# Patient Record
Sex: FEMALE | Race: OTHER | HISPANIC OR LATINO
[De-identification: names, ages, dates, MRNs, and addresses within clinical notes are randomized per-mention and may not be internally consistent; named-entity substitution may affect disease eponyms.]

---

## 2020-08-19 ENCOUNTER — APPOINTMENT (OUTPATIENT)
Dept: CARDIOLOGY | Facility: CLINIC | Age: 25
End: 2020-08-19
Payer: COMMERCIAL

## 2020-08-19 ENCOUNTER — APPOINTMENT (OUTPATIENT)
Dept: CARDIOLOGY | Facility: CLINIC | Age: 25
End: 2020-08-19

## 2020-08-19 ENCOUNTER — NON-APPOINTMENT (OUTPATIENT)
Age: 25
End: 2020-08-19

## 2020-08-19 VITALS
HEART RATE: 65 BPM | DIASTOLIC BLOOD PRESSURE: 68 MMHG | WEIGHT: 132 LBS | OXYGEN SATURATION: 100 % | SYSTOLIC BLOOD PRESSURE: 101 MMHG

## 2020-08-19 DIAGNOSIS — Z83.3 FAMILY HISTORY OF DIABETES MELLITUS: ICD-10-CM

## 2020-08-19 DIAGNOSIS — Z82.49 FAMILY HISTORY OF ISCHEMIC HEART DISEASE AND OTHER DISEASES OF THE CIRCULATORY SYSTEM: ICD-10-CM

## 2020-08-19 PROCEDURE — 99204 OFFICE O/P NEW MOD 45 MIN: CPT

## 2020-08-19 PROCEDURE — 93000 ELECTROCARDIOGRAM COMPLETE: CPT

## 2020-08-19 NOTE — DISCUSSION/SUMMARY
[FreeTextEntry1] : Sasha is a 24-year-old female here for evaluation. She reports worsening palpitations, and caffeine sensitivity, since getting sick with COVID earlier this year.\par Given her overall symptoms, we will start with an echocardiogram, and a 24-hour Holter for further evaluation. She will also need routine blood work after this.\par I have stressed the importance of getting back to exercise, to see how she feels. He will speak after the above tests, and arrange followup.

## 2020-08-19 NOTE — PHYSICAL EXAM
[General Appearance - Well Developed] : well developed [Normal Appearance] : normal appearance [Well Groomed] : well groomed [General Appearance - Well Nourished] : well nourished [No Deformities] : no deformities [General Appearance - In No Acute Distress] : no acute distress [Normal Conjunctiva] : the conjunctiva exhibited no abnormalities [Eyelids - No Xanthelasma] : the eyelids demonstrated no xanthelasmas [Normal Oral Mucosa] : normal oral mucosa [No Oral Pallor] : no oral pallor [No Oral Cyanosis] : no oral cyanosis [Normal Jugular Venous A Waves Present] : normal jugular venous A waves present [Normal Jugular Venous V Waves Present] : normal jugular venous V waves present [No Jugular Venous Obss A Waves] : no jugular venous boss A waves [Normal Rate] : normal [Normal S1] : normal S1 [Normal S2] : normal S2 [S3] : no S3 [S4] : no S4 [No Murmur] : no murmurs heard [Right Carotid Bruit] : no bruit heard over the right carotid [Left Carotid Bruit] : no bruit heard over the left carotid [Right Femoral Bruit] : no bruit heard over the right femoral artery [Left Femoral Bruit] : no bruit heard over the left femoral artery [2+] : left 2+ [No Abnormalities] : the abdominal aorta was not enlarged and no bruit was heard [No Pitting Edema] : no pitting edema present [Respiration, Rhythm And Depth] : normal respiratory rhythm and effort [Exaggerated Use Of Accessory Muscles For Inspiration] : no accessory muscle use [Auscultation Breath Sounds / Voice Sounds] : lungs were clear to auscultation bilaterally [Abdomen Soft] : soft [Abdomen Tenderness] : non-tender [Abdomen Mass (___ Cm)] : no abdominal mass palpated [Abnormal Walk] : normal gait [Gait - Sufficient For Exercise Testing] : the gait was sufficient for exercise testing [Nail Clubbing] : no clubbing of the fingernails [Cyanosis, Localized] : no localized cyanosis [Petechial Hemorrhages (___cm)] : no petechial hemorrhages [Skin Color & Pigmentation] : normal skin color and pigmentation [] : no rash [No Venous Stasis] : no venous stasis [No Skin Ulcers] : no skin ulcer [Skin Lesions] : no skin lesions [No Xanthoma] : no  xanthoma was observed [Oriented To Time, Place, And Person] : oriented to person, place, and time [Affect] : the affect was normal [Mood] : the mood was normal [No Anxiety] : not feeling anxious

## 2020-08-19 NOTE — HISTORY OF PRESENT ILLNESS
[FreeTextEntry1] : Sasha is a 24-year-old female here for evaluation.\par She is generally healthy. She got COVID in 4/2020, and has since reported an occasional left-sided chest pain, fatigue on exertion, and intermittent palpitations.\par She feels that she is very sensitive to caffeine and soda, with racing heart rates after drinking either. Prior to the virus, she did report elevated heart rates, usually in the middle of the night.\par \par She has not been exercising since the pandemic, but walks around without significant issue. She does not take any medications. There is a history of hypertension, diabetes, and CAD in her family. She denies toxic habits.

## 2020-09-11 ENCOUNTER — APPOINTMENT (OUTPATIENT)
Dept: CARDIOLOGY | Facility: CLINIC | Age: 25
End: 2020-09-11

## 2020-09-15 ENCOUNTER — APPOINTMENT (OUTPATIENT)
Dept: CARDIOLOGY | Facility: CLINIC | Age: 25
End: 2020-09-15
Payer: COMMERCIAL

## 2020-09-23 ENCOUNTER — APPOINTMENT (OUTPATIENT)
Dept: CARDIOLOGY | Facility: CLINIC | Age: 25
End: 2020-09-23

## 2020-10-04 PROCEDURE — 93224 XTRNL ECG REC UP TO 48 HRS: CPT

## 2020-10-29 ENCOUNTER — APPOINTMENT (OUTPATIENT)
Dept: CARDIOLOGY | Facility: CLINIC | Age: 25
End: 2020-10-29

## 2021-01-20 ENCOUNTER — APPOINTMENT (OUTPATIENT)
Dept: CARDIOLOGY | Facility: CLINIC | Age: 26
End: 2021-01-20
Payer: COMMERCIAL

## 2021-01-20 PROCEDURE — 99072 ADDL SUPL MATRL&STAF TM PHE: CPT

## 2021-01-20 PROCEDURE — 93306 TTE W/DOPPLER COMPLETE: CPT

## 2021-07-26 ENCOUNTER — TRANSCRIPTION ENCOUNTER (OUTPATIENT)
Age: 26
End: 2021-07-26

## 2021-08-05 ENCOUNTER — NON-APPOINTMENT (OUTPATIENT)
Age: 26
End: 2021-08-05

## 2021-08-06 ENCOUNTER — APPOINTMENT (OUTPATIENT)
Dept: INTERNAL MEDICINE | Facility: CLINIC | Age: 26
End: 2021-08-06
Payer: COMMERCIAL

## 2021-08-06 ENCOUNTER — NON-APPOINTMENT (OUTPATIENT)
Age: 26
End: 2021-08-06

## 2021-08-06 VITALS
OXYGEN SATURATION: 97 % | WEIGHT: 130 LBS | SYSTOLIC BLOOD PRESSURE: 110 MMHG | TEMPERATURE: 97.1 F | BODY MASS INDEX: 23.92 KG/M2 | HEIGHT: 62 IN | DIASTOLIC BLOOD PRESSURE: 70 MMHG | RESPIRATION RATE: 14 BRPM | HEART RATE: 78 BPM

## 2021-08-06 PROCEDURE — 99385 PREV VISIT NEW AGE 18-39: CPT

## 2021-08-06 NOTE — PHYSICAL EXAM
[No Acute Distress] : no acute distress [Well Nourished] : well nourished [Well Developed] : well developed [Well-Appearing] : well-appearing [Normal Sclera/Conjunctiva] : normal sclera/conjunctiva [PERRL] : pupils equal round and reactive to light [EOMI] : extraocular movements intact [Normal Outer Ear/Nose] : the outer ears and nose were normal in appearance [Normal Oropharynx] : the oropharynx was normal [No JVD] : no jugular venous distention [No Lymphadenopathy] : no lymphadenopathy [Supple] : supple [Thyroid Normal, No Nodules] : the thyroid was normal and there were no nodules present [No Respiratory Distress] : no respiratory distress  [No Accessory Muscle Use] : no accessory muscle use [Clear to Auscultation] : lungs were clear to auscultation bilaterally [Normal Rate] : normal rate  [Regular Rhythm] : with a regular rhythm [Normal S1, S2] : normal S1 and S2 [No Murmur] : no murmur heard [No Carotid Bruits] : no carotid bruits [No Abdominal Bruit] : a ~M bruit was not heard ~T in the abdomen [No Varicosities] : no varicosities [Pedal Pulses Present] : the pedal pulses are present [No Edema] : there was no peripheral edema [No Palpable Aorta] : no palpable aorta [No Extremity Clubbing/Cyanosis] : no extremity clubbing/cyanosis [Soft] : abdomen soft [Non Tender] : non-tender [Non-distended] : non-distended [No Masses] : no abdominal mass palpated [No HSM] : no HSM [Normal Bowel Sounds] : normal bowel sounds [Normal Posterior Cervical Nodes] : no posterior cervical lymphadenopathy [Normal Anterior Cervical Nodes] : no anterior cervical lymphadenopathy [No CVA Tenderness] : no CVA  tenderness [No Spinal Tenderness] : no spinal tenderness [No Joint Swelling] : no joint swelling [Grossly Normal Strength/Tone] : grossly normal strength/tone [No Rash] : no rash [Coordination Grossly Intact] : coordination grossly intact [No Focal Deficits] : no focal deficits [Normal Gait] : normal gait [Deep Tendon Reflexes (DTR)] : deep tendon reflexes were 2+ and symmetric [Normal Affect] : the affect was normal [Normal Insight/Judgement] : insight and judgment were intact [de-identified] : (+) pain in lumbar region with sidebending and rotation to right

## 2021-08-06 NOTE — HISTORY OF PRESENT ILLNESS
[de-identified] : 25 y.o. F with no significant PMHx presents as new pt to establish care. Pt with complaint of right-sided flank pain and lower lumbar pain for the past 2 weeks, intermittent, mostly when she is standing for too long. The pain is described as an achy pain and radiates down into the leg. Has taken advil or tylenol here and there which does seem to help. Fell 2 weeks ago, slid down the stairs and started afterwards. Went to GYN and had pelvic US and ruled out ovarian cyst and fibroid .Had a colonoscopy 3 months ago for constipation which was reportedly normal. Had covid last year.

## 2021-08-06 NOTE — PLAN
[FreeTextEntry1] : HCM: \par -check labs \par -UTD with pap smear \par \par Back pain: \par -recommend OTC NSAID prn \par -apply heat \par -PT if needed

## 2021-08-12 LAB
25(OH)D3 SERPL-MCNC: 26.9 NG/ML
ALBUMIN SERPL ELPH-MCNC: 4.4 G/DL
ALP BLD-CCNC: 83 U/L
ALT SERPL-CCNC: 14 U/L
ANION GAP SERPL CALC-SCNC: 14 MMOL/L
AST SERPL-CCNC: 14 U/L
BASOPHILS # BLD AUTO: 0.07 K/UL
BASOPHILS NFR BLD AUTO: 1 %
BILIRUB SERPL-MCNC: 0.2 MG/DL
BUN SERPL-MCNC: 11 MG/DL
CALCIUM SERPL-MCNC: 9.1 MG/DL
CHLORIDE SERPL-SCNC: 105 MMOL/L
CHOLEST SERPL-MCNC: 157 MG/DL
CO2 SERPL-SCNC: 22 MMOL/L
CREAT SERPL-MCNC: 0.67 MG/DL
EOSINOPHIL # BLD AUTO: 0.17 K/UL
EOSINOPHIL NFR BLD AUTO: 2.4 %
ESTIMATED AVERAGE GLUCOSE: 103 MG/DL
FOLATE SERPL-MCNC: 9.9 NG/ML
GLUCOSE SERPL-MCNC: 83 MG/DL
HBA1C MFR BLD HPLC: 5.2 %
HCT VFR BLD CALC: 38.2 %
HDLC SERPL-MCNC: 55 MG/DL
HGB BLD-MCNC: 12.3 G/DL
IMM GRANULOCYTES NFR BLD AUTO: 0.3 %
LDLC SERPL CALC-MCNC: 94 MG/DL
LYMPHOCYTES # BLD AUTO: 2.18 K/UL
LYMPHOCYTES NFR BLD AUTO: 31.2 %
MAN DIFF?: NORMAL
MCHC RBC-ENTMCNC: 29.1 PG
MCHC RBC-ENTMCNC: 32.2 GM/DL
MCV RBC AUTO: 90.3 FL
MONOCYTES # BLD AUTO: 0.5 K/UL
MONOCYTES NFR BLD AUTO: 7.2 %
NEUTROPHILS # BLD AUTO: 4.05 K/UL
NEUTROPHILS NFR BLD AUTO: 57.9 %
NONHDLC SERPL-MCNC: 101 MG/DL
PLATELET # BLD AUTO: 287 K/UL
POTASSIUM SERPL-SCNC: 4.7 MMOL/L
PROT SERPL-MCNC: 7 G/DL
RBC # BLD: 4.23 M/UL
RBC # FLD: 13.2 %
SODIUM SERPL-SCNC: 141 MMOL/L
TRIGL SERPL-MCNC: 37 MG/DL
TSH SERPL-ACNC: 2.3 UIU/ML
VIT B12 SERPL-MCNC: 719 PG/ML
WBC # FLD AUTO: 6.99 K/UL

## 2021-09-11 ENCOUNTER — APPOINTMENT (OUTPATIENT)
Dept: DISASTER EMERGENCY | Facility: OTHER | Age: 26
End: 2021-09-11

## 2021-10-16 ENCOUNTER — APPOINTMENT (OUTPATIENT)
Dept: DISASTER EMERGENCY | Facility: OTHER | Age: 26
End: 2021-10-16

## 2021-11-01 ENCOUNTER — APPOINTMENT (OUTPATIENT)
Dept: INTERNAL MEDICINE | Facility: CLINIC | Age: 26
End: 2021-11-01
Payer: COMMERCIAL

## 2021-11-01 ENCOUNTER — APPOINTMENT (OUTPATIENT)
Dept: DERMATOLOGY | Facility: CLINIC | Age: 26
End: 2021-11-01

## 2021-11-01 VITALS
RESPIRATION RATE: 14 BRPM | OXYGEN SATURATION: 98 % | WEIGHT: 127 LBS | TEMPERATURE: 97.3 F | SYSTOLIC BLOOD PRESSURE: 110 MMHG | DIASTOLIC BLOOD PRESSURE: 68 MMHG | HEIGHT: 62 IN | HEART RATE: 66 BPM | BODY MASS INDEX: 23.37 KG/M2

## 2021-11-01 DIAGNOSIS — N64.4 MASTODYNIA: ICD-10-CM

## 2021-11-01 PROCEDURE — 99214 OFFICE O/P EST MOD 30 MIN: CPT

## 2021-11-01 NOTE — PHYSICAL EXAM
[No Acute Distress] : no acute distress [Well-Appearing] : well-appearing [Normal Voice/Communication] : normal voice/communication [Normal Sclera/Conjunctiva] : normal sclera/conjunctiva [PERRL] : pupils equal round and reactive to light [No Respiratory Distress] : no respiratory distress  [No Accessory Muscle Use] : no accessory muscle use [Clear to Auscultation] : lungs were clear to auscultation bilaterally [Normal Rate] : normal rate  [Regular Rhythm] : with a regular rhythm [No Murmur] : no murmur heard [Coordination Grossly Intact] : coordination grossly intact [No Focal Deficits] : no focal deficits [Normal Gait] : normal gait [Deep Tendon Reflexes (DTR)] : deep tendon reflexes were 2+ and symmetric [Alert and Oriented x3] : oriented to person, place, and time [de-identified] : TTP b/l paraspinal muscles and b/l occiput, L>R

## 2021-11-01 NOTE — HISTORY OF PRESENT ILLNESS
[FreeTextEntry8] : Pt with complaint of pressure in the back of the head, mostly on the left side. It comes and goes, has been going on for a couple of months. Gets headaches when she ties her hair which is new for her. Since covid, she has been hyperaware of things and always thinking the worst of her symptoms. She hasn't taken anything for her symptoms. No changes in vision. The pain lasts for 2 hours at a time and comes and goes, 7/10 at its worst. No recent head trauma or injury. She has also been having left breast pain, had a breast US which was normal. She is also having right sided milky nipple discharge. Had blood work including prolactin which was reportedly normal. She made an appt to see a breast surgeon.

## 2021-11-01 NOTE — PLAN
[FreeTextEntry1] : occipital headache: could be 2/2 paraspinal muscle strain, recommend applying heat to paraspinal muscles and trial of OTC NSAIDs for one week, if no improvement recommend f/u with neuro \par breast pain, nipple discharge: f/u with breast surgery

## 2021-11-03 ENCOUNTER — RESULT REVIEW (OUTPATIENT)
Age: 26
End: 2021-11-03

## 2021-11-03 ENCOUNTER — APPOINTMENT (OUTPATIENT)
Dept: OTOLARYNGOLOGY | Facility: CLINIC | Age: 26
End: 2021-11-03
Payer: COMMERCIAL

## 2021-11-03 ENCOUNTER — NON-APPOINTMENT (OUTPATIENT)
Age: 26
End: 2021-11-03

## 2021-11-03 DIAGNOSIS — G50.1 ATYPICAL FACIAL PAIN: ICD-10-CM

## 2021-11-03 PROCEDURE — 99203 OFFICE O/P NEW LOW 30 MIN: CPT

## 2021-11-03 PROCEDURE — 70486 CT MAXILLOFACIAL W/O DYE: CPT

## 2021-11-03 NOTE — PHYSICAL EXAM
[Normal] : mucosa is normal [Midline] : trachea located in midline position [FreeTextEntry1] : sinus tenderness on left frontal, left maxillary, and left ethmoid

## 2021-11-03 NOTE — HISTORY OF PRESENT ILLNESS
[de-identified] : 25 y.o female presents with 4 day history of headache. Headache is located left temporal,maxillary,frontal and ethmoidal areas. She is having some yellow gelatinous discharge from left nare.

## 2021-11-03 NOTE — ASSESSMENT
[FreeTextEntry1] : 25 y.o female presents with 4 day history of headache. Headache is located left temporal,maxillary,frontal and ethmoidal areas. She is having some yellow gelatinous discharge from left nare.\par \par Tenderness on all sinuses \par Yellow discharge\par \par CT sinus today\par Chronic B/L sinus disease. Polyp left maxillary sinus. Thickening in ethmoids. Obstruction of frontals.. Deviated septum. ? old septal fracture\par \par \par Plan\par CT sinus reviewed and interpreted\par Start Augmentin 500 mg 1 tab BID\par Flonase 2 sprays in each nostril QD(spray laterally)

## 2021-11-14 ENCOUNTER — NON-APPOINTMENT (OUTPATIENT)
Age: 26
End: 2021-11-14

## 2021-11-15 ENCOUNTER — APPOINTMENT (OUTPATIENT)
Dept: DERMATOLOGY | Facility: CLINIC | Age: 26
End: 2021-11-15

## 2021-11-17 ENCOUNTER — APPOINTMENT (OUTPATIENT)
Dept: SURGICAL ONCOLOGY | Facility: CLINIC | Age: 26
End: 2021-11-17
Payer: COMMERCIAL

## 2021-11-17 ENCOUNTER — NON-APPOINTMENT (OUTPATIENT)
Age: 26
End: 2021-11-17

## 2021-11-17 VITALS
HEART RATE: 84 BPM | OXYGEN SATURATION: 100 % | SYSTOLIC BLOOD PRESSURE: 113 MMHG | WEIGHT: 127 LBS | BODY MASS INDEX: 23.37 KG/M2 | RESPIRATION RATE: 15 BRPM | TEMPERATURE: 98.7 F | HEIGHT: 62 IN | DIASTOLIC BLOOD PRESSURE: 73 MMHG

## 2021-11-17 PROCEDURE — 99204 OFFICE O/P NEW MOD 45 MIN: CPT

## 2021-11-17 NOTE — OB HISTORY
[Menarche Age ____] : menarche age [unfilled] [Definite ___ (Date)] : the last menstrual period was [unfilled] [Total Preg ___] : G[unfilled]

## 2021-11-30 RX ORDER — FLUTICASONE PROPIONATE 50 UG/1
50 SPRAY, METERED NASAL
Qty: 3 | Refills: 3 | Status: ACTIVE | COMMUNITY
Start: 2021-11-03 | End: 1900-01-01

## 2021-11-30 NOTE — ASSESSMENT
[FreeTextEntry1] : Bilateral nipple discharge likely physiologic \par Reassured patient that index of suspicion for malignancy is low\par BI-RADS 1 ultrasound October 2021\par RTO 6 months \par

## 2021-11-30 NOTE — ADDENDUM
[FreeTextEntry1] : I, Tracey Frankel, acted solely as a scribe for Dr. Ash Morrow on this date 11/17/2021.\par

## 2021-11-30 NOTE — PHYSICAL EXAM
[Normal] : supple, no neck mass and thyroid not enlarged [Normal Neck Lymph Nodes] : normal neck lymph nodes  [Normal Supraclavicular Lymph Nodes] : normal supraclavicular lymph nodes [Normal Groin Lymph Nodes] : normal groin lymph nodes [Normal Axillary Lymph Nodes] : normal axillary lymph nodes [Normal] : oriented to person, place and time, with appropriate affect [de-identified] : cloudy discharge right 11:00 duct and left 8:00 duct.  GUAIC negative bilaterally.

## 2021-11-30 NOTE — HISTORY OF PRESENT ILLNESS
[de-identified] : Patient is a 26 y/o female who presents an initial consultation. She notes white/milky, non-spontaneous bilateral nipple discharge for the past year. Denies any bloody discharge. Reports regular menstrual cycles. Her recent Prolactin level was 11.4 \par \par Her recent bilateral ultrasound performed on 10/21/21 was unremarkable study. No cystic or solid lesions identified bilaterally. Clinical management of symptoms advised. (BI-RADS 1-negative)\par \par Patient reports no family history of breast cancer. \par Denies any prior breast biopsies.

## 2021-11-30 NOTE — CONSULT LETTER
[Dear  ___] : Dear  [unfilled], [Consult Letter:] : I had the pleasure of evaluating your patient, [unfilled]. [Please see my note below.] : Please see my note below. [Sincerely,] : Sincerely, [FreeTextEntry3] : Ash Morrow MD FACS\par

## 2021-12-08 ENCOUNTER — APPOINTMENT (OUTPATIENT)
Dept: NEUROLOGY | Facility: CLINIC | Age: 26
End: 2021-12-08

## 2022-01-04 DIAGNOSIS — Z20.822 CONTACT WITH AND (SUSPECTED) EXPOSURE TO COVID-19: ICD-10-CM

## 2022-01-08 ENCOUNTER — APPOINTMENT (OUTPATIENT)
Dept: DERMATOLOGY | Facility: CLINIC | Age: 27
End: 2022-01-08

## 2022-01-11 ENCOUNTER — LABORATORY RESULT (OUTPATIENT)
Age: 27
End: 2022-01-11

## 2022-01-11 ENCOUNTER — APPOINTMENT (OUTPATIENT)
Dept: DERMATOLOGY | Facility: CLINIC | Age: 27
End: 2022-01-11

## 2022-01-21 ENCOUNTER — APPOINTMENT (OUTPATIENT)
Dept: CARDIOLOGY | Facility: CLINIC | Age: 27
End: 2022-01-21

## 2022-01-25 ENCOUNTER — APPOINTMENT (OUTPATIENT)
Dept: NEUROLOGY | Facility: CLINIC | Age: 27
End: 2022-01-25

## 2022-01-26 ENCOUNTER — APPOINTMENT (OUTPATIENT)
Dept: INTERNAL MEDICINE | Facility: CLINIC | Age: 27
End: 2022-01-26
Payer: COMMERCIAL

## 2022-01-26 ENCOUNTER — NON-APPOINTMENT (OUTPATIENT)
Age: 27
End: 2022-01-26

## 2022-01-26 VITALS
WEIGHT: 130 LBS | TEMPERATURE: 97.5 F | DIASTOLIC BLOOD PRESSURE: 72 MMHG | OXYGEN SATURATION: 98 % | HEART RATE: 90 BPM | SYSTOLIC BLOOD PRESSURE: 114 MMHG | RESPIRATION RATE: 15 BRPM | BODY MASS INDEX: 23.92 KG/M2 | HEIGHT: 62 IN

## 2022-01-26 PROCEDURE — 93000 ELECTROCARDIOGRAM COMPLETE: CPT

## 2022-01-26 PROCEDURE — 99214 OFFICE O/P EST MOD 30 MIN: CPT | Mod: 25

## 2022-01-26 RX ORDER — AZITHROMYCIN 250 MG/1
250 TABLET, FILM COATED ORAL
Qty: 1 | Refills: 2 | Status: DISCONTINUED | COMMUNITY
Start: 2021-12-21 | End: 2022-01-26

## 2022-01-26 NOTE — END OF VISIT
[FreeTextEntry3] : "I, Sharifa Rodriguez, personally scribed the services dictated to me by Dr. Riky Riddle MD in this documentation on 01/26/2022 " \par \par "I Dr. Riky Riddle MD, personally performed the services described in this documentation on 01/26/2022 for the patient as scribed by Sharifa Rodriguez in my presence. I have reviewed and verified that all the information is accurate and true."\par

## 2022-01-26 NOTE — HISTORY OF PRESENT ILLNESS
[FreeTextEntry8] : PARADISE MCCOLLUM is a 26 year old F who presents today for an acute visit. Pt complains of chest pressure for 3 months that comes and goes. Pt has concerned its related to COVID as she had it in 2020. Pt also has lower back pain. PT takes anti-inflammatory but does not help relieve back.

## 2022-01-26 NOTE — HEALTH RISK ASSESSMENT
[Never] : Never [No] : In the past 12 months have you used drugs other than those required for medical reasons? No [No falls in past year] : Patient reported no falls in the past year [0] : 2) Feeling down, depressed, or hopeless: Not at all (0) [PHQ-2 Negative - No further assessment needed] : PHQ-2 Negative - No further assessment needed [[id number]] : 0

## 2022-01-27 ENCOUNTER — LABORATORY RESULT (OUTPATIENT)
Age: 27
End: 2022-01-27

## 2022-02-11 ENCOUNTER — APPOINTMENT (OUTPATIENT)
Dept: BREAST CENTER | Facility: CLINIC | Age: 27
End: 2022-02-11

## 2022-03-03 ENCOUNTER — RESULT REVIEW (OUTPATIENT)
Age: 27
End: 2022-03-03

## 2022-03-03 ENCOUNTER — APPOINTMENT (OUTPATIENT)
Dept: RADIOLOGY | Facility: CLINIC | Age: 27
End: 2022-03-03
Payer: COMMERCIAL

## 2022-03-03 ENCOUNTER — OUTPATIENT (OUTPATIENT)
Dept: OUTPATIENT SERVICES | Facility: HOSPITAL | Age: 27
LOS: 1 days | End: 2022-03-03
Payer: COMMERCIAL

## 2022-03-03 DIAGNOSIS — M54.50 LOW BACK PAIN, UNSPECIFIED: ICD-10-CM

## 2022-03-03 PROCEDURE — 72100 X-RAY EXAM L-S SPINE 2/3 VWS: CPT | Mod: 26

## 2022-03-03 PROCEDURE — 72100 X-RAY EXAM L-S SPINE 2/3 VWS: CPT

## 2022-03-30 ENCOUNTER — APPOINTMENT (OUTPATIENT)
Dept: INTERNAL MEDICINE | Facility: CLINIC | Age: 27
End: 2022-03-30
Payer: COMMERCIAL

## 2022-03-30 VITALS
OXYGEN SATURATION: 98 % | WEIGHT: 128 LBS | RESPIRATION RATE: 16 BRPM | HEIGHT: 62 IN | BODY MASS INDEX: 23.55 KG/M2 | DIASTOLIC BLOOD PRESSURE: 68 MMHG | TEMPERATURE: 97.7 F | HEART RATE: 88 BPM | SYSTOLIC BLOOD PRESSURE: 102 MMHG

## 2022-03-30 DIAGNOSIS — M54.50 LOW BACK PAIN, UNSPECIFIED: ICD-10-CM

## 2022-03-30 PROCEDURE — 99214 OFFICE O/P EST MOD 30 MIN: CPT

## 2022-03-30 NOTE — HEALTH RISK ASSESSMENT
[Never] : Never [No] : In the past 12 months have you used drugs other than those required for medical reasons? No [No falls in past year] : Patient reported no falls in the past year [0] : 2) Feeling down, depressed, or hopeless: Not at all (0) [PHQ-2 Negative - No further assessment needed] : PHQ-2 Negative - No further assessment needed [[id number]] : 0

## 2022-03-30 NOTE — HISTORY OF PRESENT ILLNESS
[FreeTextEntry1] : follow up  [de-identified] : PARADISE MCCOLLUM is a 26 year old F who presents today for follow up for R lower back pain that radiates to the R leg. PT is still complaining of back pain. Pt's X-ray work up was normal.

## 2022-03-30 NOTE — END OF VISIT
[FreeTextEntry3] : "I, Sharifa Rodriguez, personally scribed the services dictated to me by Dr. Riky Riddle MD in this documentation on 03/30/2022 " \par \par "I Dr. Riky Riddle MD, personally performed the services described in this documentation on 03/30/2022 for the patient as scribed by Sharifa Rodriguez in my presence. I have reviewed and verified that all the information is accurate and true."\par

## 2022-03-31 ENCOUNTER — APPOINTMENT (OUTPATIENT)
Dept: CARDIOLOGY | Facility: CLINIC | Age: 27
End: 2022-03-31

## 2022-05-10 ENCOUNTER — APPOINTMENT (OUTPATIENT)
Dept: MRI IMAGING | Facility: CLINIC | Age: 27
End: 2022-05-10
Payer: COMMERCIAL

## 2022-05-10 ENCOUNTER — OUTPATIENT (OUTPATIENT)
Dept: OUTPATIENT SERVICES | Facility: HOSPITAL | Age: 27
LOS: 1 days | End: 2022-05-10
Payer: COMMERCIAL

## 2022-05-10 ENCOUNTER — TRANSCRIPTION ENCOUNTER (OUTPATIENT)
Age: 27
End: 2022-05-10

## 2022-05-10 DIAGNOSIS — M54.50 LOW BACK PAIN, UNSPECIFIED: ICD-10-CM

## 2022-05-10 PROCEDURE — 72148 MRI LUMBAR SPINE W/O DYE: CPT

## 2022-05-10 PROCEDURE — 72148 MRI LUMBAR SPINE W/O DYE: CPT | Mod: 26

## 2022-05-19 DIAGNOSIS — R10.9 UNSPECIFIED ABDOMINAL PAIN: ICD-10-CM

## 2022-06-03 ENCOUNTER — TRANSCRIPTION ENCOUNTER (OUTPATIENT)
Age: 27
End: 2022-06-03

## 2022-06-03 ENCOUNTER — APPOINTMENT (OUTPATIENT)
Dept: SURGICAL ONCOLOGY | Facility: CLINIC | Age: 27
End: 2022-06-03

## 2022-06-03 NOTE — CONSULT LETTER
[Dear  ___] : Dear  [unfilled], [Consult Letter:] : I had the pleasure of evaluating your patient, [unfilled]. [Please see my note below.] : Please see my note below. [Sincerely,] : Sincerely, [FreeTextEntry3] : sAh Morrow MD FACS\par

## 2022-06-03 NOTE — HISTORY OF PRESENT ILLNESS
[de-identified] : Patient is a 27 y/o female who presents a follow up visit. She notes white/milky, non-spontaneous bilateral nipple discharge for the past year. Denies any bloody discharge. Reports regular menstrual cycles. Her recent Prolactin level was 11.4 \par \par Bilateral ultrasound performed on 10/21/21 was unremarkable study. No cystic or solid lesions identified bilaterally. Clinical management of symptoms advised. (BI-RADS 1-negative)\par \par Patient reports no family history of breast cancer. \par Denies any prior breast biopsies.

## 2022-06-03 NOTE — ADDENDUM
[FreeTextEntry1] : I, Tracey Frankel, acted solely as a scribe for Dr. Ash Morrow on this date 06/03/2022.\par

## 2022-06-03 NOTE — PHYSICAL EXAM
[Normal] : supple, no neck mass and thyroid not enlarged [Normal Neck Lymph Nodes] : normal neck lymph nodes  [Normal Supraclavicular Lymph Nodes] : normal supraclavicular lymph nodes [Normal Groin Lymph Nodes] : normal groin lymph nodes [Normal Axillary Lymph Nodes] : normal axillary lymph nodes [Normal] : oriented to person, place and time, with appropriate affect [de-identified] : cloudy discharge right 11:00 duct and left 8:00 duct.  GUAIC negative bilaterally.

## 2022-06-05 ENCOUNTER — OUTPATIENT (OUTPATIENT)
Dept: OUTPATIENT SERVICES | Facility: HOSPITAL | Age: 27
LOS: 1 days | End: 2022-06-05
Payer: COMMERCIAL

## 2022-06-05 ENCOUNTER — APPOINTMENT (OUTPATIENT)
Dept: ULTRASOUND IMAGING | Facility: CLINIC | Age: 27
End: 2022-06-05
Payer: COMMERCIAL

## 2022-06-05 DIAGNOSIS — R10.9 UNSPECIFIED ABDOMINAL PAIN: ICD-10-CM

## 2022-06-05 DIAGNOSIS — Z00.8 ENCOUNTER FOR OTHER GENERAL EXAMINATION: ICD-10-CM

## 2022-06-05 PROCEDURE — 76700 US EXAM ABDOM COMPLETE: CPT

## 2022-06-05 PROCEDURE — 76700 US EXAM ABDOM COMPLETE: CPT | Mod: 26

## 2022-06-12 ENCOUNTER — TRANSCRIPTION ENCOUNTER (OUTPATIENT)
Age: 27
End: 2022-06-12

## 2022-06-13 ENCOUNTER — TRANSCRIPTION ENCOUNTER (OUTPATIENT)
Age: 27
End: 2022-06-13

## 2022-06-13 ENCOUNTER — APPOINTMENT (OUTPATIENT)
Dept: INTERNAL MEDICINE | Facility: CLINIC | Age: 27
End: 2022-06-13
Payer: COMMERCIAL

## 2022-06-13 VITALS
HEIGHT: 62 IN | TEMPERATURE: 97.4 F | DIASTOLIC BLOOD PRESSURE: 70 MMHG | RESPIRATION RATE: 14 BRPM | HEART RATE: 73 BPM | WEIGHT: 130 LBS | SYSTOLIC BLOOD PRESSURE: 110 MMHG | OXYGEN SATURATION: 98 % | BODY MASS INDEX: 23.92 KG/M2

## 2022-06-13 DIAGNOSIS — R19.5 OTHER FECAL ABNORMALITIES: ICD-10-CM

## 2022-06-13 PROCEDURE — 99214 OFFICE O/P EST MOD 30 MIN: CPT

## 2022-06-13 RX ORDER — AMOXICILLIN AND CLAVULANATE POTASSIUM 500; 125 MG/1; MG/1
500-125 TABLET, FILM COATED ORAL
Qty: 20 | Refills: 0 | Status: DISCONTINUED | COMMUNITY
Start: 2021-11-03 | End: 2022-06-13

## 2022-06-13 RX ORDER — METHYLPREDNISOLONE 4 MG/1
4 TABLET ORAL
Qty: 1 | Refills: 1 | Status: DISCONTINUED | COMMUNITY
Start: 2022-03-30 | End: 2022-06-13

## 2022-06-13 RX ORDER — PANTOPRAZOLE 40 MG/1
40 TABLET, DELAYED RELEASE ORAL
Qty: 30 | Refills: 3 | Status: ACTIVE | COMMUNITY
Start: 2022-06-13 | End: 1900-01-01

## 2022-06-13 RX ORDER — METRONIDAZOLE 500 MG/1
500 TABLET ORAL
Qty: 14 | Refills: 0 | Status: DISCONTINUED | COMMUNITY
Start: 2022-02-14

## 2022-06-13 NOTE — PHYSICAL EXAM
[No Acute Distress] : no acute distress [Well Nourished] : well nourished [Well Developed] : well developed [Well-Appearing] : well-appearing [Normal Voice/Communication] : normal voice/communication [Normal Sclera/Conjunctiva] : normal sclera/conjunctiva [PERRL] : pupils equal round and reactive to light [EOMI] : extraocular movements intact [Normal Outer Ear/Nose] : the outer ears and nose were normal in appearance [Normal Oropharynx] : the oropharynx was normal [No JVD] : no jugular venous distention [No Lymphadenopathy] : no lymphadenopathy [Supple] : supple [Thyroid Normal, No Nodules] : the thyroid was normal and there were no nodules present [No Respiratory Distress] : no respiratory distress  [No Accessory Muscle Use] : no accessory muscle use [Clear to Auscultation] : lungs were clear to auscultation bilaterally [Normal Rate] : normal rate  [Regular Rhythm] : with a regular rhythm [Normal S1, S2] : normal S1 and S2 [No Murmur] : no murmur heard [No Carotid Bruits] : no carotid bruits [No Abdominal Bruit] : a ~M bruit was not heard ~T in the abdomen [No Varicosities] : no varicosities [Pedal Pulses Present] : the pedal pulses are present [No Edema] : there was no peripheral edema [No Palpable Aorta] : no palpable aorta [No Extremity Clubbing/Cyanosis] : no extremity clubbing/cyanosis [Soft] : abdomen soft [Non Tender] : non-tender [Non-distended] : non-distended [No Masses] : no abdominal mass palpated [No HSM] : no HSM [Normal Bowel Sounds] : normal bowel sounds [Normal Supraclavicular Nodes] : no supraclavicular lymphadenopathy [Normal Posterior Cervical Nodes] : no posterior cervical lymphadenopathy [Normal Anterior Cervical Nodes] : no anterior cervical lymphadenopathy [No CVA Tenderness] : no CVA  tenderness [No Spinal Tenderness] : no spinal tenderness [No Joint Swelling] : no joint swelling [Grossly Normal Strength/Tone] : grossly normal strength/tone [No Rash] : no rash [Coordination Grossly Intact] : coordination grossly intact [No Focal Deficits] : no focal deficits [Normal Gait] : normal gait [Deep Tendon Reflexes (DTR)] : deep tendon reflexes were 2+ and symmetric [Speech Grossly Normal] : speech grossly normal [Memory Grossly Normal] : memory grossly normal [Normal Affect] : the affect was normal [Alert and Oriented x3] : oriented to person, place, and time [Normal Mood] : the mood was normal [Normal Insight/Judgement] : insight and judgment were intact [Declined Rectal Exam] : declined rectal exam

## 2022-06-13 NOTE — PLAN
[FreeTextEntry1] : does not wish stool test in office today declines rectal exam \par to see GI\par believe dark stools from Pepto \par start Protoniix

## 2022-06-13 NOTE — HEALTH RISK ASSESSMENT
[Never] : Never [No] : In the past 12 months have you used drugs other than those required for medical reasons? No [Two or more falls in past year] : Patient reported two or more falls in the past year [0] : 2) Feeling down, depressed, or hopeless: Not at all (0) [[id number]] : 0

## 2022-06-13 NOTE — HISTORY OF PRESENT ILLNESS
[FreeTextEntry8] : PARADISE MCCOLLUM is a 26 year old F who presents today for dark stools took Pepto has GERD to see GI\par after eating better on PEPCID

## 2022-06-14 ENCOUNTER — EMERGENCY (EMERGENCY)
Facility: HOSPITAL | Age: 27
LOS: 1 days | Discharge: HOME | End: 2022-06-14
Attending: INTERNAL MEDICINE | Admitting: INTERNAL MEDICINE
Payer: COMMERCIAL

## 2022-06-14 VITALS
HEART RATE: 98 BPM | DIASTOLIC BLOOD PRESSURE: 55 MMHG | RESPIRATION RATE: 14 BRPM | OXYGEN SATURATION: 100 % | SYSTOLIC BLOOD PRESSURE: 106 MMHG

## 2022-06-14 VITALS
OXYGEN SATURATION: 100 % | RESPIRATION RATE: 22 BRPM | DIASTOLIC BLOOD PRESSURE: 71 MMHG | HEART RATE: 135 BPM | WEIGHT: 133.6 LBS | SYSTOLIC BLOOD PRESSURE: 130 MMHG | HEIGHT: 63 IN | TEMPERATURE: 98 F

## 2022-06-14 LAB
ALBUMIN SERPL ELPH-MCNC: 4 G/DL — SIGNIFICANT CHANGE UP (ref 3.3–5)
ALP SERPL-CCNC: 79 U/L — SIGNIFICANT CHANGE UP (ref 30–120)
ALT FLD-CCNC: 17 U/L DA — SIGNIFICANT CHANGE UP (ref 10–60)
ANION GAP SERPL CALC-SCNC: 10 MMOL/L — SIGNIFICANT CHANGE UP (ref 5–17)
ANION GAP SERPL CALC-SCNC: 12 MMOL/L
AST SERPL-CCNC: 14 U/L — SIGNIFICANT CHANGE UP (ref 10–40)
BASOPHILS # BLD AUTO: 0.05 K/UL
BASOPHILS NFR BLD AUTO: 0.7 %
BILIRUB SERPL-MCNC: 0.5 MG/DL — SIGNIFICANT CHANGE UP (ref 0.2–1.2)
BUN SERPL-MCNC: 16 MG/DL — SIGNIFICANT CHANGE UP (ref 7–23)
BUN SERPL-MCNC: 9 MG/DL
CALCIUM SERPL-MCNC: 9.3 MG/DL — SIGNIFICANT CHANGE UP (ref 8.4–10.5)
CALCIUM SERPL-MCNC: 9.4 MG/DL
CHLORIDE SERPL-SCNC: 104 MMOL/L
CHLORIDE SERPL-SCNC: 105 MMOL/L — SIGNIFICANT CHANGE UP (ref 96–108)
CO2 SERPL-SCNC: 24 MMOL/L
CO2 SERPL-SCNC: 24 MMOL/L — SIGNIFICANT CHANGE UP (ref 22–31)
CREAT SERPL-MCNC: 0.8 MG/DL — SIGNIFICANT CHANGE UP (ref 0.5–1.3)
CREAT SERPL-MCNC: 0.84 MG/DL
D DIMER BLD IA.RAPID-MCNC: <150 NG/ML DDU — SIGNIFICANT CHANGE UP
EGFR: 104 ML/MIN/1.73M2 — SIGNIFICANT CHANGE UP
EGFR: 98 ML/MIN/1.73M2
EOSINOPHIL # BLD AUTO: 0.21 K/UL
EOSINOPHIL NFR BLD AUTO: 3 %
GLUCOSE SERPL-MCNC: 120 MG/DL — HIGH (ref 70–99)
GLUCOSE SERPL-MCNC: 71 MG/DL
HCG SERPL-ACNC: <1 MIU/ML — SIGNIFICANT CHANGE UP
HCT VFR BLD CALC: 36.8 %
HCT VFR BLD CALC: 36.8 % — SIGNIFICANT CHANGE UP (ref 34.5–45)
HGB BLD-MCNC: 11.9 G/DL
HGB BLD-MCNC: 12.1 G/DL — SIGNIFICANT CHANGE UP (ref 11.5–15.5)
IMM GRANULOCYTES NFR BLD AUTO: 0.3 %
LYMPHOCYTES # BLD AUTO: 2.93 K/UL
LYMPHOCYTES NFR BLD AUTO: 42 %
MAN DIFF?: NORMAL
MCHC RBC-ENTMCNC: 29.2 PG
MCHC RBC-ENTMCNC: 29.2 PG — SIGNIFICANT CHANGE UP (ref 27–34)
MCHC RBC-ENTMCNC: 32.3 GM/DL
MCHC RBC-ENTMCNC: 32.9 GM/DL — SIGNIFICANT CHANGE UP (ref 32–36)
MCV RBC AUTO: 88.9 FL — SIGNIFICANT CHANGE UP (ref 80–100)
MCV RBC AUTO: 90.4 FL
MONOCYTES # BLD AUTO: 0.56 K/UL
MONOCYTES NFR BLD AUTO: 8 %
NEUTROPHILS # BLD AUTO: 3.2 K/UL
NEUTROPHILS NFR BLD AUTO: 46 %
NRBC # BLD: 0 /100 WBCS — SIGNIFICANT CHANGE UP (ref 0–0)
PLATELET # BLD AUTO: 266 K/UL
PLATELET # BLD AUTO: 274 K/UL — SIGNIFICANT CHANGE UP (ref 150–400)
POTASSIUM SERPL-MCNC: 3.9 MMOL/L — SIGNIFICANT CHANGE UP (ref 3.5–5.3)
POTASSIUM SERPL-SCNC: 3.9 MMOL/L — SIGNIFICANT CHANGE UP (ref 3.5–5.3)
POTASSIUM SERPL-SCNC: 4.5 MMOL/L
PROT SERPL-MCNC: 7.6 G/DL — SIGNIFICANT CHANGE UP (ref 6–8.3)
RBC # BLD: 4.07 M/UL
RBC # BLD: 4.14 M/UL — SIGNIFICANT CHANGE UP (ref 3.8–5.2)
RBC # FLD: 12.9 % — SIGNIFICANT CHANGE UP (ref 10.3–14.5)
RBC # FLD: 13.4 %
SODIUM SERPL-SCNC: 139 MMOL/L — SIGNIFICANT CHANGE UP (ref 135–145)
SODIUM SERPL-SCNC: 140 MMOL/L
TROPONIN I, HIGH SENSITIVITY RESULT: <4 NG/L — SIGNIFICANT CHANGE UP
WBC # BLD: 9.87 K/UL — SIGNIFICANT CHANGE UP (ref 3.8–10.5)
WBC # FLD AUTO: 6.97 K/UL
WBC # FLD AUTO: 9.87 K/UL — SIGNIFICANT CHANGE UP (ref 3.8–10.5)

## 2022-06-14 PROCEDURE — 99284 EMERGENCY DEPT VISIT MOD MDM: CPT | Mod: 25

## 2022-06-14 PROCEDURE — 85379 FIBRIN DEGRADATION QUANT: CPT

## 2022-06-14 PROCEDURE — 85027 COMPLETE CBC AUTOMATED: CPT

## 2022-06-14 PROCEDURE — 80053 COMPREHEN METABOLIC PANEL: CPT

## 2022-06-14 PROCEDURE — 96374 THER/PROPH/DIAG INJ IV PUSH: CPT

## 2022-06-14 PROCEDURE — 96375 TX/PRO/DX INJ NEW DRUG ADDON: CPT

## 2022-06-14 PROCEDURE — 93005 ELECTROCARDIOGRAM TRACING: CPT

## 2022-06-14 PROCEDURE — 84702 CHORIONIC GONADOTROPIN TEST: CPT

## 2022-06-14 PROCEDURE — 36415 COLL VENOUS BLD VENIPUNCTURE: CPT

## 2022-06-14 PROCEDURE — 93010 ELECTROCARDIOGRAM REPORT: CPT

## 2022-06-14 PROCEDURE — 84484 ASSAY OF TROPONIN QUANT: CPT

## 2022-06-14 PROCEDURE — 99285 EMERGENCY DEPT VISIT HI MDM: CPT

## 2022-06-14 RX ORDER — ONDANSETRON 8 MG/1
4 TABLET, FILM COATED ORAL ONCE
Refills: 0 | Status: COMPLETED | OUTPATIENT
Start: 2022-06-14 | End: 2022-06-14

## 2022-06-14 RX ORDER — SODIUM CHLORIDE 9 MG/ML
1000 INJECTION INTRAMUSCULAR; INTRAVENOUS; SUBCUTANEOUS ONCE
Refills: 0 | Status: COMPLETED | OUTPATIENT
Start: 2022-06-14 | End: 2022-06-14

## 2022-06-14 RX ADMIN — Medication 0.5 MILLIGRAM(S): at 22:02

## 2022-06-14 RX ADMIN — ONDANSETRON 4 MILLIGRAM(S): 8 TABLET, FILM COATED ORAL at 22:02

## 2022-06-14 RX ADMIN — SODIUM CHLORIDE 1000 MILLILITER(S): 9 INJECTION INTRAMUSCULAR; INTRAVENOUS; SUBCUTANEOUS at 22:03

## 2022-06-14 NOTE — ED PROVIDER NOTE - CLINICAL SUMMARY MEDICAL DECISION MAKING FREE TEXT BOX
acute palpitations and anxiousness  labs ekg are normal, improvement with ativan,  discharged with o/p referral

## 2022-06-14 NOTE — ED ADULT TRIAGE NOTE - TEMPERATURE IN FAHRENHEIT (DEGREES F)
Called pt back, VM box full and cannot leave any msgs,   Awaiting call back if he sees missed call. Will also mychart pt.
Pt has questions regarding rx for prostate biopsy, please advise
98

## 2022-06-14 NOTE — ED PROVIDER NOTE - OBJECTIVE STATEMENT
I was eating at a restaurant and I felt like my heart was going very fast and then I started feeling nausea  palpitations 27 y/o female h/o palpitations C/C I was eating at a restaurant and I felt like my heart was going very fast and then I started feeling nausea  no CP, no SOB, no syncope no fevers, no chills, no stroke symptoms.

## 2022-06-14 NOTE — ED ADULT NURSE NOTE - OBJECTIVE STATEMENT
26 YOF A&OX3 with pmh of palpitations presents to ED for palpitations. pt states was eating dinner with family and felt heart-racing, pt put brother's apple watch on and noticed heart to be in 150s. pt complains of nausea and headaches. cardiac monitor and EKG completed in ED as per protocol. pt appears to have tremors of hands. pt denies sob, chest pain, diarrhea, dizziness, blurry vision. safety maintained.

## 2022-06-14 NOTE — ED ADULT TRIAGE NOTE - CHIEF COMPLAINT QUOTE
I was eating at a restaurant and I felt like my heart was going very fast and then I started feeling nausea

## 2022-06-14 NOTE — ED PROVIDER NOTE - CARE PROVIDER_API CALL
Marty Dewitt (MD)  Cardiovascular Disease; Internal Medicine  [address], [address]  [place] [address]  Phone: [phone]  Fax: [phone]  Follow Up Time: 1-3 Days

## 2022-06-14 NOTE — ED PROVIDER NOTE - PATIENT PORTAL LINK FT
You can access the FollowMyHealth Patient Portal offered by [facility] by registering at the following website: [email or web address]. By joining [facility]’s FollowMyHealth portal, you will also be able to view your health information using other applications (apps) compatible with our system.

## 2022-06-14 NOTE — ED ADULT NURSE NOTE - NS PRO PASSIVE SMOKE EXP
10/29/2021      Juanito Vargas  [id number]  [address] [address]  1970        Dear Juanito Vargas,        To whom it may concern:  Patient was evaluated in the office today.  Due to ongoing intestinal issues it is advised patient work his assigned route only, no other routes until next appointment on 2-25-22.      Sincerely:        Richie Richter MD  [address] [place] [address]  Phone: [phone]  Fax: [phone]   No

## 2022-06-15 NOTE — HISTORY OF PRESENT ILLNESS
[de-identified] : Patient is a 25 y/o female who presents a follow up visit. She notes white/milky, non-spontaneous bilateral nipple discharge for the past year. Denies any bloody discharge. Reports regular menstrual cycles. Her recent Prolactin level was 11.4 \par \par Bilateral ultrasound performed on 10/21/21 was unremarkable study. No cystic or solid lesions identified bilaterally. Clinical management of symptoms advised. (BI-RADS 1-negative)\par \par Patient reports no family history of breast cancer. \par Denies any prior breast biopsies.

## 2022-06-15 NOTE — PHYSICAL EXAM
[Normal] : supple, no neck mass and thyroid not enlarged [Normal Neck Lymph Nodes] : normal neck lymph nodes  [Normal Supraclavicular Lymph Nodes] : normal supraclavicular lymph nodes [Normal Groin Lymph Nodes] : normal groin lymph nodes [Normal Axillary Lymph Nodes] : normal axillary lymph nodes [Normal] : oriented to person, place and time, with appropriate affect [de-identified] : cloudy discharge right 11:00 duct and left 8:00 duct.  GUAIC negative bilaterally.

## 2022-06-15 NOTE — ADDENDUM
[FreeTextEntry1] : I, Tracey Frankel, acted solely as a scribe for Dr. Ash Morrow on this date 06/17/2022.\par \par

## 2022-06-16 LAB — HEMOCCULT STL QL IA: NEGATIVE

## 2022-06-17 ENCOUNTER — APPOINTMENT (OUTPATIENT)
Dept: SURGICAL ONCOLOGY | Facility: CLINIC | Age: 27
End: 2022-06-17

## 2022-06-21 ENCOUNTER — APPOINTMENT (OUTPATIENT)
Dept: CARDIOLOGY | Facility: CLINIC | Age: 27
End: 2022-06-21
Payer: COMMERCIAL

## 2022-06-21 ENCOUNTER — NON-APPOINTMENT (OUTPATIENT)
Age: 27
End: 2022-06-21

## 2022-06-21 VITALS
DIASTOLIC BLOOD PRESSURE: 66 MMHG | BODY MASS INDEX: 23.74 KG/M2 | HEART RATE: 65 BPM | WEIGHT: 129 LBS | OXYGEN SATURATION: 100 % | HEIGHT: 62 IN | SYSTOLIC BLOOD PRESSURE: 99 MMHG

## 2022-06-21 DIAGNOSIS — R00.2 PALPITATIONS: ICD-10-CM

## 2022-06-21 PROCEDURE — 93000 ELECTROCARDIOGRAM COMPLETE: CPT

## 2022-06-21 PROCEDURE — 99214 OFFICE O/P EST MOD 30 MIN: CPT

## 2022-06-21 NOTE — HISTORY OF PRESENT ILLNESS
[FreeTextEntry1] : Sasha is a 26-year-old female here for follow up.\par \par I last saw her in 8/2020.\par \par She is generally healthy. She got COVID in 4/2020, and had since reported an occasional left-sided chest pain, fatigue on exertion, and intermittent palpitations.\par She had an echocardiogram in 2021 which demonstrated normal left ventricular systolic function, without significant valvular pathology.  A 24-hour Holter demonstrated no significant ectopy.\par \par She presented to the emergency room in 6/322 at [facility] with shortness of breath. She woke up and could not breath. Her heart was not racing, though she was dizzy/nauseas. Her left arm was numb and went to the ER. There a CT head and EKG were unremarkable. \par \par She then returned to the ER on 6/14/22 with palpitations and fast HRs while eating at her father's birthday dinner. Evaluation in the ER was unremarkable. Her EKG showed a ST without worrisome findings. \par \par She has not been exercising since the pandemic, but walks around without significant issue. She reports dizziness with exercise. She does not take any medications, other than Protonix. There is a history of hypertension, diabetes, and CAD in her family. She denies toxic habits.

## 2022-06-21 NOTE — DISCUSSION/SUMMARY
[FreeTextEntry1] : Sasha is a 26 year-old female here for follow up. She recently went to the ER with palpitations and fast heart rates, which resolved upon presentation. \par \par She is reporting fast heart rates and dizziness with exertion, and I am going to set her up for an exercise stress test, as well as a 2 week monitor to evaluate the etiology of her symptoms. Her echocardiogram was normal as of 2021.\par She will go out of her way to increase her water intake, and avoid caffeine/alcohol.\par \par I have stressed the importance of getting back to exercise, to see how she feels. He will speak after the above tests, and arrange followup. [EKG obtained to assist in diagnosis and management of assessed problem(s)] : EKG obtained to assist in diagnosis and management of assessed problem(s)

## 2022-06-28 ENCOUNTER — APPOINTMENT (OUTPATIENT)
Dept: CARDIOLOGY | Facility: CLINIC | Age: 27
End: 2022-06-28

## 2022-06-28 PROCEDURE — 93015 CV STRESS TEST SUPVJ I&R: CPT

## 2022-07-13 ENCOUNTER — RESULT REVIEW (OUTPATIENT)
Age: 27
End: 2022-07-13

## 2022-07-28 ENCOUNTER — TRANSCRIPTION ENCOUNTER (OUTPATIENT)
Age: 27
End: 2022-07-28

## 2022-07-29 ENCOUNTER — TRANSCRIPTION ENCOUNTER (OUTPATIENT)
Age: 27
End: 2022-07-29

## 2022-08-03 ENCOUNTER — APPOINTMENT (OUTPATIENT)
Dept: GASTROENTEROLOGY | Facility: CLINIC | Age: 27
End: 2022-08-03

## 2022-08-08 ENCOUNTER — APPOINTMENT (OUTPATIENT)
Dept: INTERNAL MEDICINE | Facility: CLINIC | Age: 27
End: 2022-08-08

## 2022-08-10 ENCOUNTER — RESULT CHARGE (OUTPATIENT)
Age: 27
End: 2022-08-10

## 2022-08-12 ENCOUNTER — NON-APPOINTMENT (OUTPATIENT)
Age: 27
End: 2022-08-12

## 2022-08-12 ENCOUNTER — APPOINTMENT (OUTPATIENT)
Dept: INTERNAL MEDICINE | Facility: CLINIC | Age: 27
End: 2022-08-12

## 2022-08-12 VITALS
RESPIRATION RATE: 16 BRPM | OXYGEN SATURATION: 99 % | WEIGHT: 128 LBS | DIASTOLIC BLOOD PRESSURE: 78 MMHG | HEART RATE: 72 BPM | BODY MASS INDEX: 22.68 KG/M2 | SYSTOLIC BLOOD PRESSURE: 120 MMHG | TEMPERATURE: 97.34 F | HEIGHT: 63 IN

## 2022-08-12 DIAGNOSIS — Z82.49 FAMILY HISTORY OF ISCHEMIC HEART DISEASE AND OTHER DISEASES OF THE CIRCULATORY SYSTEM: ICD-10-CM

## 2022-08-12 DIAGNOSIS — K21.9 GASTRO-ESOPHAGEAL REFLUX DISEASE W/OUT ESOPHAGITIS: ICD-10-CM

## 2022-08-12 DIAGNOSIS — Z83.438 FAMILY HISTORY OF OTHER DISORDER OF LIPOPROTEIN METABOLISM AND OTHER LIPIDEMIA: ICD-10-CM

## 2022-08-12 DIAGNOSIS — Z13.21 ENCOUNTER FOR SCREENING FOR NUTRITIONAL DISORDER: ICD-10-CM

## 2022-08-12 DIAGNOSIS — R68.89 OTHER GENERAL SYMPTOMS AND SIGNS: ICD-10-CM

## 2022-08-12 PROCEDURE — 99385 PREV VISIT NEW AGE 18-39: CPT | Mod: 25

## 2022-08-12 PROCEDURE — 93000 ELECTROCARDIOGRAM COMPLETE: CPT | Mod: 59

## 2022-08-12 PROCEDURE — 99214 OFFICE O/P EST MOD 30 MIN: CPT | Mod: 25

## 2022-08-12 PROCEDURE — 36415 COLL VENOUS BLD VENIPUNCTURE: CPT

## 2022-08-12 RX ORDER — FAMOTIDINE 40 MG/1
40 TABLET, FILM COATED ORAL DAILY
Qty: 90 | Refills: 0 | Status: ACTIVE | COMMUNITY
Start: 2022-08-12 | End: 1900-01-01

## 2022-08-12 RX ORDER — CYCLOBENZAPRINE HYDROCHLORIDE 10 MG/1
10 TABLET, FILM COATED ORAL
Qty: 10 | Refills: 1 | Status: DISCONTINUED | COMMUNITY
Start: 2022-03-30 | End: 2022-08-12

## 2022-08-14 PROBLEM — K21.9 GERD (GASTROESOPHAGEAL REFLUX DISEASE): Status: ACTIVE | Noted: 2022-06-13

## 2022-08-14 PROBLEM — R68.89 FORGETFULNESS: Status: ACTIVE | Noted: 2022-08-14

## 2022-08-14 PROBLEM — Z82.49 FAMILY HISTORY OF HYPERTENSION: Status: ACTIVE | Noted: 2022-08-12

## 2022-08-14 PROBLEM — Z83.438 FAMILY HISTORY OF HYPERLIPIDEMIA: Status: ACTIVE | Noted: 2022-08-12

## 2022-08-14 LAB
25(OH)D3 SERPL-MCNC: 25.9 NG/ML
ALBUMIN SERPL ELPH-MCNC: 4.7 G/DL
ALP BLD-CCNC: 82 U/L
ALT SERPL-CCNC: 12 U/L
ANION GAP SERPL CALC-SCNC: 14 MMOL/L
AST SERPL-CCNC: 15 U/L
BASOPHILS # BLD AUTO: 0.04 K/UL
BASOPHILS NFR BLD AUTO: 0.5 %
BILIRUB SERPL-MCNC: 0.5 MG/DL
BUN SERPL-MCNC: 9 MG/DL
CALCIUM SERPL-MCNC: 9.6 MG/DL
CHLORIDE SERPL-SCNC: 105 MMOL/L
CHOLEST SERPL-MCNC: 158 MG/DL
CO2 SERPL-SCNC: 21 MMOL/L
CREAT SERPL-MCNC: 0.58 MG/DL
EGFR: 128 ML/MIN/1.73M2
EOSINOPHIL # BLD AUTO: 0.17 K/UL
EOSINOPHIL NFR BLD AUTO: 2.1 %
ESTIMATED AVERAGE GLUCOSE: 103 MG/DL
GLUCOSE SERPL-MCNC: 79 MG/DL
HBA1C MFR BLD HPLC: 5.2 %
HCT VFR BLD CALC: 40.7 %
HDLC SERPL-MCNC: 53 MG/DL
HGB BLD-MCNC: 12.7 G/DL
IMM GRANULOCYTES NFR BLD AUTO: 0.1 %
LDLC SERPL CALC-MCNC: 97 MG/DL
LYMPHOCYTES # BLD AUTO: 2.53 K/UL
LYMPHOCYTES NFR BLD AUTO: 31.8 %
MAN DIFF?: NORMAL
MCHC RBC-ENTMCNC: 28.8 PG
MCHC RBC-ENTMCNC: 31.2 GM/DL
MCV RBC AUTO: 92.3 FL
MONOCYTES # BLD AUTO: 0.57 K/UL
MONOCYTES NFR BLD AUTO: 7.2 %
NEUTROPHILS # BLD AUTO: 4.63 K/UL
NEUTROPHILS NFR BLD AUTO: 58.3 %
NONHDLC SERPL-MCNC: 104 MG/DL
PLATELET # BLD AUTO: 259 K/UL
POTASSIUM SERPL-SCNC: 4 MMOL/L
PROT SERPL-MCNC: 7.4 G/DL
RBC # BLD: 4.41 M/UL
RBC # FLD: 13.4 %
SODIUM SERPL-SCNC: 141 MMOL/L
TRIGL SERPL-MCNC: 39 MG/DL
TSH SERPL-ACNC: 1.65 UIU/ML
WBC # FLD AUTO: 7.95 K/UL

## 2022-08-14 NOTE — DATA REVIEWED
[FreeTextEntry1] : EKG shows sinus rhythm at 66 BPM with inverted T wave in leads 2 and 3, no significant change from prior.

## 2022-08-14 NOTE — ADDENDUM
[FreeTextEntry1] : I, Keith Yates, acted solely as scribe for Dr. Osito Walker DO on this date 08/12/2022  2:20PM .\par \par All medical record entries made by the Scribe were at my, Dr. Osito Walker DO direction and personally dictated by me on 08/12/2022  2:20PM. I have reviewed the chart and agree that the record accurately reflects my personal performance of the history, physical exam, assessment and plan. I have also personally directed, reviewed and agreed with the chart.\par

## 2022-08-14 NOTE — HISTORY OF PRESENT ILLNESS
[FreeTextEntry1] : new patient annual wellness visit [de-identified] : New patient is a 26 year old female with a past medical history as below who presents for an annual wellness visit. Patient states she last saw GYN in December 2020. Patient has not been maintaining a well-balanced diet or exercising regularly. Patient did not receive the flu vaccine for the last flu season. She is unsure if she has received the Tetanus Vaccine in the past 10 years. She has received 2 doses of the COVID-19 Vaccine.\par \par Patient notes seeing breast surgeon, Dr. Morrow in November 2021 regarding white/milky, non-spontaneous bilateral nipple discharge. She continues to note this discharge. Bilateral Breast US dated 10/21/21 was unremarkable; no cystic or solid lesions; symptoms unlikely to be secondary to malignancy as per Dr. Morrow.\par \par Patient notes seeing cardiologist, Dr. Lima regarding left-sided chest pain, intermittent palpitations, and fatigue on exertion that began shortly after COVID-19 infection in April 2020. Holter Monitor dated 9/15/20 demonstrated sinus rhythm without significant ectopy; normal study. Echocardiogram dated 1/20/21 was normal. Patient had presented to the [facility] ER on 6/3/22; c/o SOB, heart racing, dizziness, nausea, LUE numbness. CT Head/EKG were unremarkable. She had also presented to the ER on 6/14/22, c/o palpitations/elevated HR after eating. Evaluation in the ER was unremarkable; EKG showed no worrisome findings as per Dr. Lima. Stress Test dated 6/28/22 was normal. Holter Monitor dated 8/5/22 was normal. Patient continues to intermittently note elevated HR, typically after eating; also notes SOB, but no chest pain. \par Patient has noted increased anxiety. She has noted increased anxiety with flying. She has been seeing a therapist. She states that she was never anxious prior to having these medical issues.\par \par Patient notes recent endoscopy secondary to complaints of acid reflux/heartburn. She typically noted these symptoms after consuming spicy foods/citrus fruits. She was prescribed Pantoprazole Sodium, but has not been taking the medication daily as prescribed secondary to nausea.\par IFOBT dated 6/14/22 was negative.\par \par Patient has noted forgetfulness over the course of the past 2 months.\par

## 2022-08-14 NOTE — ASSESSMENT
[FreeTextEntry1] : New patient is a 26 year old female with a past medical history as above who presents for an annual wellness visit.\par

## 2022-08-14 NOTE — PLAN
[FreeTextEntry1] : Cardiology\par history of elevated HR/palpitations - check EKG (results as above); check thyroid panel - discussed referral to cardiology or cardiac EP for Event Monitor; will discuss case with cardiologist, Dr. Lima \par Gynecology\par Advised to follow up with GYN for annual visit; results of pap smear to be requested\par white/milky nipple discharge - follow up with surgical oncologist, Dr. Morrow and gynecologist\par Gastroenterology\par GERD - off Pantoprazole Sodium 40mg; start Famotidine 40mg p.o.q.d. as directed, Rx filled - discussed antireflux measures - advised against spicy food consumption - advised against tomato/tomato product consumption - advised against citrus fruit/juice consumption - advised against peppermint/chocolate consumption - advised against caffeinated/carbonated beverage consumption - advised smaller, more frequent meals - advised sitting upright for 3 hours after meals\par Hematology\par history of anemia - check CBC and Total Serum Iron\par Psychiatry\par anxiety - continue to follow up with therapist, consider a trial of daily anxiolytic if work up continues to be negative\par ID\par The possibility of Long Covid might unify the patients various diagnoses since they have all occurred since she contracted Covid 19 virus \par \par check EKG (results as above), female panel, hemoglobin A1C, thyroid panel, and vitamin panel

## 2022-08-14 NOTE — REVIEW OF SYSTEMS
[Palpitations] : palpitations [Heartburn] : heartburn [Anxiety] : anxiety [Fatigue] : fatigue [Negative] : Musculoskeletal [FreeTextEntry2] : feeling weak  [FreeTextEntry5] : elevated HR  [FreeTextEntry7] : acid reflux [de-identified] : forgetfulness

## 2022-08-14 NOTE — PHYSICAL EXAM
[No Acute Distress] : no acute distress [Well Nourished] : well nourished [Well Developed] : well developed [Well-Appearing] : well-appearing [Normal Sclera/Conjunctiva] : normal sclera/conjunctiva [PERRL] : pupils equal round and reactive to light [EOMI] : extraocular movements intact [Normal Outer Ear/Nose] : the outer ears and nose were normal in appearance [Normal Oropharynx] : the oropharynx was normal [No JVD] : no jugular venous distention [No Lymphadenopathy] : no lymphadenopathy [Supple] : supple [Thyroid Normal, No Nodules] : the thyroid was normal and there were no nodules present [No Respiratory Distress] : no respiratory distress  [No Accessory Muscle Use] : no accessory muscle use [Clear to Auscultation] : lungs were clear to auscultation bilaterally [Normal Rate] : normal rate  [Regular Rhythm] : with a regular rhythm [Normal S1, S2] : normal S1 and S2 [No Murmur] : no murmur heard [No Carotid Bruits] : no carotid bruits [No Abdominal Bruit] : a ~M bruit was not heard ~T in the abdomen [No Varicosities] : no varicosities [Pedal Pulses Present] : the pedal pulses are present [No Edema] : there was no peripheral edema [No Palpable Aorta] : no palpable aorta [No Extremity Clubbing/Cyanosis] : no extremity clubbing/cyanosis [Soft] : abdomen soft [Non-distended] : non-distended [No Masses] : no abdominal mass palpated [No HSM] : no HSM [Normal Bowel Sounds] : normal bowel sounds [Normal Posterior Cervical Nodes] : no posterior cervical lymphadenopathy [Normal Anterior Cervical Nodes] : no anterior cervical lymphadenopathy [No CVA Tenderness] : no CVA  tenderness [No Spinal Tenderness] : no spinal tenderness [No Joint Swelling] : no joint swelling [Grossly Normal Strength/Tone] : grossly normal strength/tone [No Rash] : no rash [Coordination Grossly Intact] : coordination grossly intact [No Focal Deficits] : no focal deficits [Normal Gait] : normal gait [Deep Tendon Reflexes (DTR)] : deep tendon reflexes were 2+ and symmetric [Normal Affect] : the affect was normal [Normal Insight/Judgement] : insight and judgment were intact [Normal Voice/Communication] : normal voice/communication [Normal TMs] : both tympanic membranes were normal [Normal Nasal Mucosa] : the nasal mucosa was normal [No Hernias] : no hernias [Kyphosis] : no kyphosis [Normal Supraclavicular Nodes] : no supraclavicular lymphadenopathy [Scoliosis] : no scoliosis [Acne] : no acne [Speech Grossly Normal] : speech grossly normal [Memory Grossly Normal] : memory grossly normal [Alert and Oriented x3] : oriented to person, place, and time [Normal Mood] : the mood was normal [de-identified] : mild epigastric tenderness to palpation

## 2022-08-15 ENCOUNTER — LABORATORY RESULT (OUTPATIENT)
Age: 27
End: 2022-08-15

## 2022-08-15 LAB
HCT VFR BLD CALC: 43.6 %
HGB BLD-MCNC: 12.8 G/DL
IRON SERPL-MCNC: 35 UG/DL
MAGNESIUM SERPL-MCNC: 1.9 MG/DL
POTASSIUM SERPL-SCNC: 4 MMOL/L

## 2022-08-16 LAB
24R-OH-CALCIDIOL SERPL-MCNC: 48 PG/ML
25(OH)D3 SERPL-MCNC: 27.3 NG/ML
T3 SERPL-MCNC: 119 NG/DL
T3FREE SERPL-MCNC: 3.04 PG/ML
T3RU NFR SERPL: 1 TBI
T4 FREE SERPL-MCNC: 1.2 NG/DL
T4 SERPL-MCNC: 7.9 UG/DL
VIT B12 SERPL-MCNC: 707 PG/ML

## 2022-08-21 LAB
VIT A SERPL-MCNC: 24.9 UG/DL
VIT B1 SERPL-MCNC: 74.1 NMOL/L

## 2022-08-21 NOTE — ED PROVIDER NOTE - DISPOSITION TYPE
[id number].  Pt here with R MCA CVA.  Alert.  Oriented to  Self.  Anxious, confused.  Agitated this evening.  Given zyprexa @ 1715 with some improvement.  Ativan available.  Generalized weakness.  Vitals stable.  AM BP soft, BP meds held.  On RA.  LS clear.  tolerating small amount reg thin diet. Able to take small pills whole w/ applesauce.  multiple large BMs.  Incontinent of urine.  Pt up with lift and assist of 2-3 to BSC and recliner.  R flank wound, cdi.  Wound VAC patent, @ 125. No drainage.  Skin cares done. No piv access.  Turn and repo'd q 2 h.  waiting for placement c/b limited availability of beds at [facility] for pts not vaccinated for covid.   DISCHARGE

## 2022-08-22 LAB — UBIQUINONE10 SERPL-MCNC: 0.54 UG/ML

## 2022-08-23 ENCOUNTER — APPOINTMENT (OUTPATIENT)
Dept: OTOLARYNGOLOGY | Facility: CLINIC | Age: 27
End: 2022-08-23

## 2022-08-23 ENCOUNTER — RESULT REVIEW (OUTPATIENT)
Age: 27
End: 2022-08-23

## 2022-08-23 ENCOUNTER — APPOINTMENT (OUTPATIENT)
Dept: INTERNAL MEDICINE | Facility: CLINIC | Age: 27
End: 2022-08-23

## 2022-08-23 VITALS
BODY MASS INDEX: 22.68 KG/M2 | OXYGEN SATURATION: 98 % | DIASTOLIC BLOOD PRESSURE: 60 MMHG | HEART RATE: 69 BPM | HEIGHT: 63 IN | RESPIRATION RATE: 16 BRPM | TEMPERATURE: 97.9 F | WEIGHT: 128 LBS | SYSTOLIC BLOOD PRESSURE: 98 MMHG

## 2022-08-23 DIAGNOSIS — R07.89 OTHER CHEST PAIN: ICD-10-CM

## 2022-08-23 DIAGNOSIS — J32.9 CHRONIC SINUSITIS, UNSPECIFIED: ICD-10-CM

## 2022-08-23 DIAGNOSIS — R00.2 PALPITATIONS: ICD-10-CM

## 2022-08-23 DIAGNOSIS — R51.9 HEADACHE, UNSPECIFIED: ICD-10-CM

## 2022-08-23 DIAGNOSIS — J31.0 CHRONIC RHINITIS: ICD-10-CM

## 2022-08-23 PROCEDURE — 70486 CT MAXILLOFACIAL W/O DYE: CPT

## 2022-08-23 PROCEDURE — 99214 OFFICE O/P EST MOD 30 MIN: CPT

## 2022-08-23 RX ORDER — AZELASTINE HYDROCHLORIDE 137 UG/1
137 SPRAY, METERED NASAL TWICE DAILY
Qty: 1 | Refills: 5 | Status: ACTIVE | COMMUNITY
Start: 2022-08-23 | End: 1900-01-01

## 2022-08-23 NOTE — HISTORY OF PRESENT ILLNESS
[FreeTextEntry8] : Patient is a 26 year old female with a past medical history as below who presents to discuss multiple complaints. Patient has noted increased anxiety/stress. She has noted difficulty maintaining sleep for the past 4 days. She notes fatigue. She intermittently notes chest pressure/palpitations. She has noted episodes of elevated HR at rest. She does not consume caffeinated beverages. She occasionally notes LUE pain. Patient inquires about a referral to a new therapist.

## 2022-08-23 NOTE — ADDENDUM
[FreeTextEntry1] : I, Keith Yates, acted solely as scribe for Dr. Osito Walker DO on this date 08/23/2022  3:30PM .\par \par All medical record entries made by the Scribe were at my, Dr. Osito Walker DO direction and personally dictated by me on 08/23/2022  3:30PM. I have reviewed the chart and agree that the record accurately reflects my personal performance of the history, physical exam, assessment and plan. I have also personally directed, reviewed and agreed with the chart.

## 2022-08-23 NOTE — REASON FOR VISIT
[Subsequent Evaluation] : a subsequent evaluation for [FreeTextEntry2] : headache, facial pain and pressure

## 2022-08-23 NOTE — HISTORY OF PRESENT ILLNESS
[No] : patient does not have a  history of radiation therapy [None] : The patient is currently asymptomatic. [de-identified] : Patient presents with several weeks of facial pain and pressure. no discharge. no difficulty breathing. not responding to nasal sprays, flonase and saline. headache progressively more severe.

## 2022-08-23 NOTE — REVIEW OF SYSTEMS
[Fatigue] : fatigue [Palpitations] : palpitations [Insomnia] : insomnia [Anxiety] : anxiety [Negative] : Heme/Lymph [FreeTextEntry5] : chest pressure  [FreeTextEntry9] : LUE pain [de-identified] : increased stress

## 2022-08-23 NOTE — PLAN
[FreeTextEntry1] : Cardiology\par palpitations/elevated HR - discussed case with cardiologist, Dr. Lima; Dr. Lima recommended following up for long-term Holter Monitor to rule out arrhythmia \par Psychiatry\par anxiety - start Escitalopram Oxalate (Lexapro) 10mg p.o.q.d. as directed, Rx filled: discussed possible side effects of the medication in detail; recommended checking weight once per week; RTO in 6 weeks to evaluate response to medication; start Alprazolam 0.25mg p.o. p.r.n. as directed, Rx filled,  reviewed; Reference Number: [id number]; advised against taking the medication concurrently with alcohol or prior to driving given CNS depression - referred to therapist/psychologist, Dr. Kelly Jeter for further evaluation \par ENT\par atypical facial pain, chronic rhinitis, chronic sinusitis - continue Azelastine HCl 137 MCG/SPRAY Nasal Spray as directed; continue Fluticasone Propionate (Flonase) 50 MCG/ACT Nasal Spray as directed - continue to follow up with ENT specialist, Dr. Hunt\par Endocrinology\par Vitamin D insufficiency - previously advised to start Vitamin D-3 2000 IU daily with a meal\par Gynecology\par Previously advised to follow up with GYN for annual visit; results of pap smear to be requested\par white/milky nipple discharge - follow up with surgical oncologist, Dr. Morrow; follow up with GYN\par Gastroenterology\par GERD - continue Famotidine 40mg p.o.q.d. as directed - continue antireflux measures\par \par RTO in 6 weeks.

## 2022-08-23 NOTE — PHYSICAL EXAM
[No Acute Distress] : no acute distress [Well Nourished] : well nourished [Well Developed] : well developed [Well-Appearing] : well-appearing [Normal Sclera/Conjunctiva] : normal sclera/conjunctiva [PERRL] : pupils equal round and reactive to light [EOMI] : extraocular movements intact [Normal Outer Ear/Nose] : the outer ears and nose were normal in appearance [Normal Oropharynx] : the oropharynx was normal [No JVD] : no jugular venous distention [No Lymphadenopathy] : no lymphadenopathy [Supple] : supple [Thyroid Normal, No Nodules] : the thyroid was normal and there were no nodules present [No Respiratory Distress] : no respiratory distress  [No Accessory Muscle Use] : no accessory muscle use [Clear to Auscultation] : lungs were clear to auscultation bilaterally [Normal Rate] : normal rate  [Regular Rhythm] : with a regular rhythm [Normal S1, S2] : normal S1 and S2 [No Murmur] : no murmur heard [No Carotid Bruits] : no carotid bruits [No Abdominal Bruit] : a ~M bruit was not heard ~T in the abdomen [No Varicosities] : no varicosities [Pedal Pulses Present] : the pedal pulses are present [No Edema] : there was no peripheral edema [No Palpable Aorta] : no palpable aorta [No Extremity Clubbing/Cyanosis] : no extremity clubbing/cyanosis [Soft] : abdomen soft [Non Tender] : non-tender [Non-distended] : non-distended [No Masses] : no abdominal mass palpated [No HSM] : no HSM [Normal Bowel Sounds] : normal bowel sounds [Normal Posterior Cervical Nodes] : no posterior cervical lymphadenopathy [Normal Anterior Cervical Nodes] : no anterior cervical lymphadenopathy [No CVA Tenderness] : no CVA  tenderness [No Spinal Tenderness] : no spinal tenderness [No Joint Swelling] : no joint swelling [Grossly Normal Strength/Tone] : grossly normal strength/tone [No Rash] : no rash [Coordination Grossly Intact] : coordination grossly intact [No Focal Deficits] : no focal deficits [Normal Gait] : normal gait [Deep Tendon Reflexes (DTR)] : deep tendon reflexes were 2+ and symmetric [Normal Affect] : the affect was normal [Normal Insight/Judgement] : insight and judgment were intact [Normal Voice/Communication] : normal voice/communication [Normal TMs] : both tympanic membranes were normal [Normal Nasal Mucosa] : the nasal mucosa was normal [No Hernias] : no hernias [Normal Supraclavicular Nodes] : no supraclavicular lymphadenopathy [Kyphosis] : no kyphosis [Scoliosis] : no scoliosis [Acne] : no acne [Speech Grossly Normal] : speech grossly normal [Memory Grossly Normal] : memory grossly normal [Alert and Oriented x3] : oriented to person, place, and time [Normal Mood] : the mood was normal

## 2022-08-23 NOTE — PHYSICAL EXAM
[] : septum deviated bilaterally [Midline] : trachea located in midline position [Normal] : orientation to person, place, and time: normal [de-identified] : mild tenderness bilaterally [de-identified] : mildly enflamed [FreeTextEntry2] : tender over frontal ethmoid regions

## 2022-08-23 NOTE — ASSESSMENT
[FreeTextEntry1] : Patient is a 26 year old female with a past medical history as above who presents for chest pressure, palpitations, and insomnia possibly secondary to anxiety, r/o cardiac etiology.

## 2022-08-23 NOTE — ASSESSMENT
[FreeTextEntry1] : Reviewed and reconciled medications, allergies, PMHx, PSHx, SocHx, FMHx \par \par Patient presents with several weeks of facial pain and pressure. no discharge. no difficulty breathing. not responding to nasal sprays, flonase and saline. headache progressively more severe. \par \par Mini CAT:\par -deviated septum to the left\par -thickening in the lining of the maxillaries\par -right corner of ethmoid has some clouding\par -left ethmoid not much of anything\par -sphenoids look clear\par -slight thickening\par \par \par Plan: Mini CAT sent to radiologist. Continue Flonase every day. Start Astelin.

## 2022-08-24 ENCOUNTER — APPOINTMENT (OUTPATIENT)
Dept: FAMILY MEDICINE | Facility: CLINIC | Age: 27
End: 2022-08-24

## 2022-08-25 ENCOUNTER — EMERGENCY (EMERGENCY)
Facility: HOSPITAL | Age: 27
LOS: 1 days | Discharge: HOME | End: 2022-08-25
Attending: EMERGENCY MEDICINE | Admitting: EMERGENCY MEDICINE
Payer: COMMERCIAL

## 2022-08-25 VITALS
OXYGEN SATURATION: 100 % | TEMPERATURE: 98 F | HEIGHT: 63 IN | WEIGHT: 129.41 LBS | HEART RATE: 94 BPM | RESPIRATION RATE: 22 BRPM | SYSTOLIC BLOOD PRESSURE: 111 MMHG | DIASTOLIC BLOOD PRESSURE: 71 MMHG

## 2022-08-25 LAB — VIT C SERPL-MCNC: <0.1 MG/DL

## 2022-08-25 PROCEDURE — 99283 EMERGENCY DEPT VISIT LOW MDM: CPT

## 2022-08-25 PROCEDURE — 99284 EMERGENCY DEPT VISIT MOD MDM: CPT

## 2022-08-25 PROCEDURE — 93010 ELECTROCARDIOGRAM REPORT: CPT

## 2022-08-25 PROCEDURE — 93005 ELECTROCARDIOGRAM TRACING: CPT

## 2022-08-25 NOTE — ED PROVIDER NOTE - ENMT, MLM
Airway patent, mask in place Consent 2/Introductory Paragraph: Mohs surgery was explained to the patient and consent was obtained. The risks, benefits and alternatives to therapy were discussed in detail. Specifically, the risks of infection, scarring, bleeding, prolonged wound healing, incomplete removal, allergy to anesthesia, nerve injury and recurrence were addressed. Prior to the procedure, the treatment site was clearly identified and confirmed by the patient. All components of Universal Protocol/PAUSE Rule completed.

## 2022-08-25 NOTE — ED ADULT NURSE NOTE - NS ED PATIENT SAFETY CONCERN
Patient states there is no chance of pregnancy d/t her having an IUD in place. She consents to obtaining an Xray   No

## 2022-08-25 NOTE — ED PROVIDER NOTE - NSFOLLOWUPCLINICS_GEN_ALL_ED_FT
[facility]  Psychiatry  [address] [place] [address]  Phone: [phone]  Fax:

## 2022-08-25 NOTE — ED PROVIDER NOTE - PROGRESS NOTE DETAILS
pt ready for dc, will speak with pcp in morning regarding medication, advise to f/u psychiatry at [facility] if preferred

## 2022-08-25 NOTE — ED ADULT NURSE NOTE - NSIMPLEMENTINTERV_GEN_ALL_ED
VITAL SIGNS: I have reviewed nursing notes and confirm.  CONSTITUTIONAL: Well-developed; in no acute distress.   SKIN:  warm and dry, no acute rash.   HEAD:  normocephalic, atraumatic.  EYES: PERRL, EOM intact; conjunctiva and sclera clear.  ENT: No nasal discharge; airway clear.   NECK: Supple; non tender.  CARD: S1, S2 normal; no murmurs, gallops, or rubs. Regular rate and rhythm.   RESP:  Clear to auscultation b/l, no wheezes, rales or rhonchi.  ABD: Normal bowel sounds; soft; non-distended; non-tender; no guarding/ rebound. No CVA tenderness.  : Dwakins in place, draining clear yellow urine.  EXT: Normal ROM. No clubbing, cyanosis or edema. 2+ pulses to b/l ue/le.  NEURO: Alert, oriented, grossly unremarkable  PSYCH: Cooperative, mood and affect appropriate.
Implemented All Universal Safety Interventions:  [place] to call system. Call bell, personal items and telephone within reach. Instruct patient to call for assistance. Room bathroom lighting operational. Non-slip footwear when patient is off stretcher. Physically safe environment: no spills, clutter or unnecessary equipment. Stretcher in lowest position, wheels locked, appropriate side rails in place.

## 2022-08-25 NOTE — ED PROVIDER NOTE - CLINICAL SUMMARY MEDICAL DECISION MAKING FREE TEXT BOX
26 y.o. F c/o palpitations and cold feeling left UE 2hr after first dose lexapro - possible adverse reaction vs anxiety reaction, no sign abn circulation, will monitor, check ekg and reassess

## 2022-08-25 NOTE — ED ADULT NURSE NOTE - OBJECTIVE STATEMENT
Patient states, "I have a cold feeling on the left side of my chest that goes down my arm."  Patient reports that these symptoms along with shaking and palpitations started after taking first dose of lexapro today.  Lexapro prescribed by PCP, Dr. Riddle.  Denies CP, SOB, N/V/D.  History unremarkable aside from anxiety.

## 2022-08-25 NOTE — ED ADULT TRIAGE NOTE - LOCATION:
Right arm; Advancement Flap (Double) Text: The defect edges were debeveled with a #15 scalpel blade.  Given the location of the defect and the proximity to free margins a double advancement flap was deemed most appropriate.  Using a sterile surgical marker, the appropriate advancement flaps were drawn incorporating the defect and placing the expected incisions within the relaxed skin tension lines where possible.    The area thus outlined was incised deep to adipose tissue with a #15 scalpel blade.  The skin margins were undermined to an appropriate distance in all directions utilizing iris scissors.

## 2022-08-25 NOTE — ED PROVIDER NOTE - OBJECTIVE STATEMENT
26 y.o. F took 1st dose of lexapro and a couple of hours later felt tight left chest, pin pricks of pain on left, palpitations, cold feeling in left arm, right now just still feels the cold feeling, for the past month has had muscle pain left shoulder area, no trauma, since 2 years ago after having covid has had palpitations which have been evaluated by cardiologist, advised it is likely anxiety, pt denies having anxiety prior to covid

## 2022-08-25 NOTE — ED PROVIDER NOTE - PATIENT PORTAL LINK FT
You can access the FollowMyHealth Patient Portal offered by [facility] by registering at the following website: [email or web address]. By joining [facility]’s FollowMyHealth portal, you will also be able to view your health information using other applications (apps) compatible with our system.

## 2022-08-25 NOTE — ED PROVIDER NOTE - NSFOLLOWUPINSTRUCTIONS_ED_ALL_ED_FT
Adverse Drug Reaction    WHAT YOU NEED TO KNOW:    What is an adverse drug reaction? An adverse drug reaction is a harmful reaction to a medicine given at the correct dose. The reaction can start soon after you take the medicine, or up to 2 weeks after you stop. An adverse drug reaction can cause serious conditions such toxic epidermal necrolysis (TEN) and anaphylaxis. TEN can cause severe skin damage. Anaphylaxis is a sudden, life-threatening reaction that needs immediate treatment. Ask your healthcare provider for more information on TEN, anaphylaxis, and other serious reactions.    What are the signs and symptoms of an adverse drug reaction?   •Mild symptoms include red, itchy, flaky, or swollen skin. You may have a flat, red area on your skin that is covered with small bumps. You may also have hives.       •Severe symptoms include skin that blisters or peels, vision problems, and severe swelling or itching. Severe reactions include conditions such as toxic epidermal necrolysis (TEN). Ask your healthcare provider for more information on TEN and other serious conditions.      •Anaphylaxis symptoms include throat tightness, trouble breathing, tingling, dizziness, and wheezing. Anaphylaxis is a sudden, life-threatening reaction that needs immediate treatment. Anaphylaxis may occur if you exercise after exposure to another trigger, such as after you take an antibiotic.       How is an adverse drug reaction diagnosed? Your healthcare provider will ask about your medical history and allergies. You may need additional testing if you developed anaphylaxis after you were exposed to a trigger and then exercised. This is called exercise-induced anaphylaxis. Medicines can be a trigger. You may also need any of the following:   •A patch test means a small amount of the drug is put on your skin. The area is covered with a patch that stays on for 2 days. Then your healthcare provider will check your skin for a reaction.      •A skin prick test means a small drop of the drug is put on your forearm and your skin is pricked with a needle. Your healthcare provider will watch for a reaction.      •An intradermal test means a small amount of liquid containing the drug is put under the surface of your skin. Your healthcare provider will watch for a reaction.       •A drug provocation test is also known as a challenge test. Your healthcare provider gives you increasing doses of the drug and watches for a reaction.      How is an adverse drug reaction treated?   •Antihistamines decrease mild symptoms such as itching or a rash.       •Epinephrine is medicine used to treat severe allergic reactions such as anaphylaxis.      •Steroids reduce inflammation.       •Desensitization may be done after you have a reaction, if you need to be treated with the drug again. Your healthcare provider will give you small doses of the drug over a few hours. He will treat any allergic reaction that you have. The dose is increased a little at a time until the full dose is reached and the drug stops causing an allergic reaction.       What steps do I need to take for signs or symptoms of anaphylaxis?   •Immediately give 1 shot of epinephrine only into the outer thigh muscle.       •Leave the shot in place as directed. Your healthcare provider may recommend you leave it in place for up to 10 seconds before you remove it. This helps make sure all of the epinephrine is delivered.       •Call 911 and go to the emergency department, even if the shot improved symptoms. Do not drive yourself. Bring the used epinephrine shot with you.       What safety precautions do I need to take if I am at risk for anaphylaxis?   •Keep 2 shots of epinephrine with you at all times. You may need a second shot, because epinephrine only works for about 20 minutes and symptoms may return. Your healthcare provider can show you and family members how to give the shot. Check the expiration date every month and replace it before it expires.      •Create an action plan. Your healthcare provider can help you create a written plan that explains the allergy and an emergency plan to treat a reaction. The plan explains when to give a second epinephrine shot if symptoms return or do not improve after the first. Give copies of the action plan and emergency instructions to family members, work and school staff, and  providers. Show them how to give a shot of epinephrine.      •Be careful when you exercise. If you have had exercise-induced anaphylaxis, do not exercise right after you eat. Stop exercising right away if you start to develop any signs or symptoms of anaphylaxis. You may first feel tired, warm, or have itchy skin. Hives, swelling, and severe breathing problems may develop if you continue to exercise.      •Carry medical alert identification. Wear medical alert jewelry or carry a card that explains the medication allergy. Healthcare providers need to know that they should not give you this medicine. Ask your healthcare provider where to get these items.  Medical Alert Jewelry           •Read medicine labels before you use any medicine. Do not take anything that contains the medicine you are allergic to. This includes topical medicines that you put on your skin. Ask a pharmacist if you are not sure.       •Tell all healthcare providers about your allergy. Always tell your healthcare providers the names of medicines that you are allergic to and the symptoms of your allergic reactions.      •Ask if you need to avoid other medicines. You may be allergic to other medicines if you had an adverse reaction. Make sure you know the names of other medicines that you should not take.      Call 911 for signs or symptoms of anaphylaxis, such as trouble breathing, swelling in your mouth or throat, or wheezing. You may also have itching, a rash, hives, or feel like you are going to faint.    When should I seek immediate care?   •You have a rash with itchy, swollen, red spots.      •You have blisters, or your skin is peeling.      •You have trouble swallowing or your voice sounds hoarse.      •You have a fast or pounding heartbeat.      •Your skin or the whites of your eyes turn yellow.       When should I contact my healthcare provider?   •You think you are having an allergic reaction. Contact your healthcare provider before you take another dose of the drug.       •You have a rash.      •You have a fever.      •You have a sore throat or swollen glands. You will feel hard lumps when you touch your throat if your glands are swollen.       •Your skin itches and becomes red when you are in sunlight.      •You have questions or concerns about your condition, allergy, or care.      CARE AGREEMENT:    You have the right to help plan your care. Learn about your health condition and how it may be treated. Discuss treatment options with your healthcare providers to decide what care you want to receive. You always have the right to refuse treatment.

## 2022-08-26 ENCOUNTER — NON-APPOINTMENT (OUTPATIENT)
Age: 27
End: 2022-08-26

## 2022-08-26 VITALS
SYSTOLIC BLOOD PRESSURE: 107 MMHG | HEART RATE: 81 BPM | TEMPERATURE: 99 F | RESPIRATION RATE: 18 BRPM | OXYGEN SATURATION: 99 % | DIASTOLIC BLOOD PRESSURE: 69 MMHG

## 2022-08-28 ENCOUNTER — TRANSCRIPTION ENCOUNTER (OUTPATIENT)
Age: 27
End: 2022-08-28

## 2022-08-29 ENCOUNTER — TRANSCRIPTION ENCOUNTER (OUTPATIENT)
Age: 27
End: 2022-08-29

## 2022-09-02 ENCOUNTER — APPOINTMENT (OUTPATIENT)
Dept: SURGICAL ONCOLOGY | Facility: CLINIC | Age: 27
End: 2022-09-02

## 2022-09-12 ENCOUNTER — APPOINTMENT (OUTPATIENT)
Dept: INTERNAL MEDICINE | Facility: CLINIC | Age: 27
End: 2022-09-12

## 2022-09-12 VITALS
BODY MASS INDEX: 23.04 KG/M2 | DIASTOLIC BLOOD PRESSURE: 80 MMHG | RESPIRATION RATE: 16 BRPM | SYSTOLIC BLOOD PRESSURE: 120 MMHG | WEIGHT: 130 LBS | HEIGHT: 63 IN

## 2022-09-12 DIAGNOSIS — F41.9 ANXIETY DISORDER, UNSPECIFIED: ICD-10-CM

## 2022-09-12 PROCEDURE — 99213 OFFICE O/P EST LOW 20 MIN: CPT | Mod: 25

## 2022-09-12 RX ORDER — ALPRAZOLAM 0.25 MG/1
0.25 TABLET ORAL
Qty: 90 | Refills: 0 | Status: COMPLETED | COMMUNITY
Start: 2022-08-23 | End: 2022-09-12

## 2022-09-12 RX ORDER — ESCITALOPRAM OXALATE 10 MG/1
10 TABLET ORAL
Qty: 90 | Refills: 0 | Status: DISCONTINUED | COMMUNITY
Start: 2022-08-23 | End: 2022-09-12

## 2022-09-12 NOTE — PHYSICAL EXAM
[No Acute Distress] : no acute distress [Well Nourished] : well nourished [Well Developed] : well developed [Well-Appearing] : well-appearing [Normal Sclera/Conjunctiva] : normal sclera/conjunctiva [PERRL] : pupils equal round and reactive to light [EOMI] : extraocular movements intact [Normal Outer Ear/Nose] : the outer ears and nose were normal in appearance [Normal Oropharynx] : the oropharynx was normal [No JVD] : no jugular venous distention [No Lymphadenopathy] : no lymphadenopathy [Supple] : supple [Thyroid Normal, No Nodules] : the thyroid was normal and there were no nodules present [No Respiratory Distress] : no respiratory distress  [No Accessory Muscle Use] : no accessory muscle use [Clear to Auscultation] : lungs were clear to auscultation bilaterally [Normal Rate] : normal rate  [Regular Rhythm] : with a regular rhythm [Normal S1, S2] : normal S1 and S2 [No Murmur] : no murmur heard [No Carotid Bruits] : no carotid bruits [No Abdominal Bruit] : a ~M bruit was not heard ~T in the abdomen [No Varicosities] : no varicosities [Pedal Pulses Present] : the pedal pulses are present [No Edema] : there was no peripheral edema [No Palpable Aorta] : no palpable aorta [No Extremity Clubbing/Cyanosis] : no extremity clubbing/cyanosis [Declined Breast Exam] : declined breast exam  [Soft] : abdomen soft [Non Tender] : non-tender [Non-distended] : non-distended [No Masses] : no abdominal mass palpated [No HSM] : no HSM [Normal Bowel Sounds] : normal bowel sounds [Normal Posterior Cervical Nodes] : no posterior cervical lymphadenopathy [Normal Anterior Cervical Nodes] : no anterior cervical lymphadenopathy [No CVA Tenderness] : no CVA  tenderness [No Spinal Tenderness] : no spinal tenderness [No Joint Swelling] : no joint swelling [Grossly Normal Strength/Tone] : grossly normal strength/tone [No Rash] : no rash [Coordination Grossly Intact] : coordination grossly intact [No Focal Deficits] : no focal deficits [Normal Gait] : normal gait [Deep Tendon Reflexes (DTR)] : deep tendon reflexes were 2+ and symmetric [Speech Grossly Normal] : speech grossly normal [Normal Affect] : the affect was normal [Alert and Oriented x3] : oriented to person, place, and time [Normal Mood] : the mood was normal [Normal Insight/Judgement] : insight and judgment were intact [de-identified] :  as per gynecology

## 2022-09-12 NOTE — ASSESSMENT
[FreeTextEntry1] :  a 26-year-old female who presents to the office for follow-up on general anxiety disorder.

## 2022-09-12 NOTE — PLAN
[FreeTextEntry1] : Psychiatry-continue with psychotherapy once a week.  Counseling given to the patient today.   patient currently not on any medication.  Advised patient proper rest and sleep.  Encourage patient to start an exercise program which does help with anxiety.\par \par Bilateral nipple discharge.  Advised patient to follow-up breast surgeon.  Also advised patient to follow-up with GYN and have a clinical breast exam done.\par \par See [facility] papers\par   Advised patient she may return to work full duty without any  restrictions starting on September 13\par \par Return to office in 4 weeks for follow-up.\par

## 2022-09-12 NOTE — HISTORY OF PRESENT ILLNESS
[FreeTextEntry1] : Anxiety/panic attacks [de-identified] :  a 26-year-old female, with a past medical history as noted below, presents to the office for paperwork to be filled out secondary to anxiety and panic attacks.  Patient states she took a leave of absence from work secondary to the her elevated anxiety levels.  States her first day to leave was on August 30, 2022.  Is seeing a psychiatrist once a week.   was given escitalopram 10 mg daily by Dr. Walker but had to stop it secondary to side effects.    Was also prescribed Xanax 0.251 tab p.o. 3 times daily as needed general anxiety disorder.  Patient states she never took it secondary to the fear of having an side effects as well as becoming addicted to it.    States her psychotherapy is going well.  And believes she is capable of return to work full duty.\par   Patient states she recently returned from [place] where she believes the time away  help.  States when she was in [place] she did not have any panic attacks\par   Patient denies Being a harm to her self or somebody else.  Denies any loss of concentration or focus.\par \par  saw a breast surgeon secondary to having bilateral breast discharge.    States she has to follow back up with the breast surgeon.  Did have a  breast sonogram which was normal\par  is currently going to [employer].

## 2022-09-28 ENCOUNTER — APPOINTMENT (OUTPATIENT)
Dept: SURGICAL ONCOLOGY | Facility: CLINIC | Age: 27
End: 2022-09-28

## 2022-09-28 VITALS
BODY MASS INDEX: 23.04 KG/M2 | WEIGHT: 130 LBS | RESPIRATION RATE: 16 BRPM | HEART RATE: 90 BPM | SYSTOLIC BLOOD PRESSURE: 103 MMHG | HEIGHT: 63 IN | TEMPERATURE: 97.9 F | DIASTOLIC BLOOD PRESSURE: 66 MMHG | OXYGEN SATURATION: 99 %

## 2022-09-28 PROCEDURE — 99214 OFFICE O/P EST MOD 30 MIN: CPT

## 2022-09-28 NOTE — HISTORY OF PRESENT ILLNESS
[de-identified] : Patient is a 27 y/o female who presents a follow up visit. She had prior episode white/milky, non-spontaneous bilateral nipple discharge for the past year. She continues to note non-spontaneous, clear nipple discharge bilaterally. Denies any bloody discharge. \par \par Last Prolactin level was 11.4. \par \par Bilateral ultrasound performed on 10/21/21 was unremarkable study. No cystic or solid lesions identified bilaterally. Clinical management of symptoms advised. (BI-RADS 1-negative)\par \par Patient reports no family history of breast cancer. \par Denies any prior breast biopsies.

## 2022-09-28 NOTE — ADDENDUM
[FreeTextEntry1] : I, Tracey Frankel, acted solely as a scribe for Dr. Ash Morrow on this date 09/28/2022.\par

## 2022-09-28 NOTE — ASSESSMENT
[FreeTextEntry1] : Bilateral nipple discharge likely physiologic \par Reassured patient that index of suspicion for malignancy is low   \par Will repeat Prolactin level \par Will get follow up ultrasound \par RTO 6 months\par \par

## 2022-09-28 NOTE — PHYSICAL EXAM
[Normal] : supple, no neck mass and thyroid not enlarged [Normal Neck Lymph Nodes] : normal neck lymph nodes  [Normal Supraclavicular Lymph Nodes] : normal supraclavicular lymph nodes [Normal Groin Lymph Nodes] : normal groin lymph nodes [Normal Axillary Lymph Nodes] : normal axillary lymph nodes [Normal] : oriented to person, place and time, with appropriate affect [de-identified] : milky discharge right ducts right 9:00 and 3:00 - GUAIAC negative.  clear discharge left duct 9:00 - GUAIAC negative.

## 2022-10-04 ENCOUNTER — APPOINTMENT (OUTPATIENT)
Dept: INTERNAL MEDICINE | Facility: CLINIC | Age: 27
End: 2022-10-04

## 2022-10-26 ENCOUNTER — APPOINTMENT (OUTPATIENT)
Dept: CARDIOLOGY | Facility: CLINIC | Age: 27
End: 2022-10-26

## 2022-10-26 ENCOUNTER — RESULT REVIEW (OUTPATIENT)
Age: 27
End: 2022-10-26

## 2022-10-26 ENCOUNTER — NON-APPOINTMENT (OUTPATIENT)
Age: 27
End: 2022-10-26

## 2022-10-26 VITALS
SYSTOLIC BLOOD PRESSURE: 111 MMHG | DIASTOLIC BLOOD PRESSURE: 71 MMHG | HEART RATE: 68 BPM | OXYGEN SATURATION: 99 % | HEIGHT: 63 IN | WEIGHT: 130 LBS | BODY MASS INDEX: 23.04 KG/M2

## 2022-10-26 DIAGNOSIS — R00.2 PALPITATIONS: ICD-10-CM

## 2022-10-26 LAB — PROLACTIN SERPL-MCNC: 27.5 NG/ML

## 2022-10-26 PROCEDURE — 99214 OFFICE O/P EST MOD 30 MIN: CPT

## 2022-10-26 PROCEDURE — 93000 ELECTROCARDIOGRAM COMPLETE: CPT

## 2022-10-28 ENCOUNTER — APPOINTMENT (OUTPATIENT)
Dept: ULTRASOUND IMAGING | Facility: CLINIC | Age: 27
End: 2022-10-28

## 2022-10-28 ENCOUNTER — RESULT REVIEW (OUTPATIENT)
Age: 27
End: 2022-10-28

## 2022-10-28 ENCOUNTER — OUTPATIENT (OUTPATIENT)
Dept: OUTPATIENT SERVICES | Facility: HOSPITAL | Age: 27
LOS: 1 days | End: 2022-10-28
Payer: COMMERCIAL

## 2022-10-28 DIAGNOSIS — N64.52 NIPPLE DISCHARGE: ICD-10-CM

## 2022-10-28 DIAGNOSIS — Z00.8 ENCOUNTER FOR OTHER GENERAL EXAMINATION: ICD-10-CM

## 2022-10-28 PROCEDURE — 76641 ULTRASOUND BREAST COMPLETE: CPT

## 2022-10-28 PROCEDURE — 76641 ULTRASOUND BREAST COMPLETE: CPT | Mod: 26,50

## 2022-10-28 NOTE — DISCUSSION/SUMMARY
[FreeTextEntry1] : Sasha is a 26 year-old female here for follow up.  She is in no acute distress.  She is euvolemic on exam.  ECG illustrates sinus rhythm without evidence of ischemia or ectopy.  Her blood pressure is on the lower side of normal.\par \par Based on her symptom description and history taking, it appears Anastasia has a heightened vagal tone.  She has completed noninvasive cardiac testing, including an extended Holter monitor illustrated sinus rhythm/sinus tachycardia during triggered events. Her echocardiogram was normal as of 2021 and her exercise stress test performed earlier this year was unremarkable.\par \par We have discussed lifestyle modifications to try to eliminate frequency of symptoms.  We will hold off for now on medical management such as beta-blockade therapy.\par She will go out of her way to increase her water intake, and avoid caffeine/alcohol.  Avoiding large meals.  Avoiding sudden postural changes as applicable.\par \par I have stressed the importance of getting back to exercise, to see how she feels.  She is planning on traveling out of the country in a few months, she will follow-up with me upon her return as needed.

## 2022-10-28 NOTE — HISTORY OF PRESENT ILLNESS
[FreeTextEntry1] : Sasha is a 26-year-old female here for follow up.\par \par She is generally healthy. She got COVID in 4/2020, and had since reported an occasional left-sided chest pain, fatigue on exertion, and intermittent palpitations.\par She had an echocardiogram in 2021 which demonstrated normal left ventricular systolic function, without significant valvular pathology.  A 24-hour Holter demonstrated no significant ectopy.\par \par She presented to the emergency room in 6/322 at [facility] with shortness of breath. She woke up and could not breath. Her heart was not racing, though she was dizzy/nauseas. Her left arm was numb and went to the ER. There a CT head and EKG were unremarkable. \par She then returned to the ER on 6/14/22 with palpitations and fast HRs while eating at her father's birthday dinner. Evaluation in the ER was unremarkable. Her EKG showed a ST without worrisome findings.\par \par \par She presents today 10/26/22 for symptoms followup. She was last seen in June.  She presents back today concerned about recurrence of palpitations.  She states she was doing well over the past 2 months.  She had gone out of the state for a while and was without symptoms.\par \par She states she had follow-up with her primary care physician who felt her palpitations may being aggravated by anxiety and advised that she try Xanax and Escitalopram.  She unfortunately did not respond well to Escitalopram and was then fearful of trying Xanax.\par \par Yesterday she had an episode of palpitations that lasted for 30 minutes.  She used her own pulse oximeter and noted her HR to be 115.  This episode occurred after she ate lunch. \par She has been making note that prior episodes of palpitations and elevated heart rate were after eating a meal. She denies associated dizzy spells but occasionally feels nauseated.\par \par She reports a history of fainting when she sees blood as a young child.  Now , she does report feeling dizziness when she sees a lot of blood.

## 2022-10-28 NOTE — CARDIOLOGY SUMMARY
[de-identified] : sinus rhythm  [de-identified] : NADIA 8/2022\par AVG HR 79- SR\par symptoms episodes c/w SR/ST [de-identified] : 6/2022\par 12 METS [de-identified] : 1/2021\par NML\par EF 70%

## 2022-10-28 NOTE — PHYSICAL EXAM
[Well Developed] : well developed [Well Nourished] : well nourished [No Acute Distress] : no acute distress [Normal Conjunctiva] : normal conjunctiva [Normal Venous Pressure] : normal venous pressure [No Carotid Bruit] : no carotid bruit [Normal S1, S2] : normal S1, S2 [No Rub] : no rub [No Gallop] : no gallop [Clear Lung Fields] : clear lung fields [Good Air Entry] : good air entry [No Respiratory Distress] : no respiratory distress  [Soft] : abdomen soft [Non Tender] : non-tender [No Masses/organomegaly] : no masses/organomegaly [Normal Bowel Sounds] : normal bowel sounds [Normal Gait] : normal gait [No Edema] : no edema [No Cyanosis] : no cyanosis [No Clubbing] : no clubbing [No Varicosities] : no varicosities [No Rash] : no rash [No Skin Lesions] : no skin lesions [Moves all extremities] : moves all extremities [No Focal Deficits] : no focal deficits [Normal Speech] : normal speech [Alert and Oriented] : alert and oriented [Normal memory] : normal memory [Rhythm Regular] : regular [Normal S1] : normal S1 [Normal S2] : normal S2 [No Murmur] : no murmurs heard [No Abnormalities] : the abdominal aorta was not enlarged and no bruit was heard [Right Carotid Bruit] : no bruit heard over the right carotid [Left Carotid Bruit] : no bruit heard over the left carotid

## 2022-11-28 ENCOUNTER — NON-APPOINTMENT (OUTPATIENT)
Age: 27
End: 2022-11-28

## 2022-11-28 DIAGNOSIS — J01.90 ACUTE SINUSITIS, UNSPECIFIED: ICD-10-CM

## 2022-12-29 ENCOUNTER — APPOINTMENT (OUTPATIENT)
Dept: CARDIOLOGY | Facility: CLINIC | Age: 27
End: 2022-12-29
Payer: COMMERCIAL

## 2022-12-29 ENCOUNTER — NON-APPOINTMENT (OUTPATIENT)
Age: 27
End: 2022-12-29

## 2022-12-29 ENCOUNTER — TRANSCRIPTION ENCOUNTER (OUTPATIENT)
Age: 27
End: 2022-12-29

## 2022-12-29 PROCEDURE — 93000 ELECTROCARDIOGRAM COMPLETE: CPT

## 2023-01-31 ENCOUNTER — EMERGENCY (EMERGENCY)
Facility: HOSPITAL | Age: 28
LOS: 1 days | Discharge: HOME | End: 2023-01-31
Attending: EMERGENCY MEDICINE | Admitting: EMERGENCY MEDICINE
Payer: COMMERCIAL

## 2023-01-31 VITALS
RESPIRATION RATE: 18 BRPM | DIASTOLIC BLOOD PRESSURE: 79 MMHG | SYSTOLIC BLOOD PRESSURE: 109 MMHG | OXYGEN SATURATION: 100 % | TEMPERATURE: 98 F | HEART RATE: 75 BPM

## 2023-01-31 VITALS
OXYGEN SATURATION: 100 % | TEMPERATURE: 98 F | HEIGHT: 62 IN | SYSTOLIC BLOOD PRESSURE: 110 MMHG | WEIGHT: 130.07 LBS | RESPIRATION RATE: 16 BRPM | DIASTOLIC BLOOD PRESSURE: 72 MMHG | HEART RATE: 71 BPM

## 2023-01-31 LAB
ALBUMIN SERPL ELPH-MCNC: 3.9 G/DL — SIGNIFICANT CHANGE UP (ref 3.3–5)
ALP SERPL-CCNC: 76 U/L — SIGNIFICANT CHANGE UP (ref 40–120)
ALT FLD-CCNC: 22 U/L — SIGNIFICANT CHANGE UP (ref 12–78)
ANION GAP SERPL CALC-SCNC: 4 MMOL/L — LOW (ref 5–17)
APPEARANCE UR: ABNORMAL
AST SERPL-CCNC: 17 U/L — SIGNIFICANT CHANGE UP (ref 15–37)
BASOPHILS # BLD AUTO: 0.03 K/UL — SIGNIFICANT CHANGE UP (ref 0–0.2)
BASOPHILS NFR BLD AUTO: 0.5 % — SIGNIFICANT CHANGE UP (ref 0–2)
BILIRUB SERPL-MCNC: 0.6 MG/DL — SIGNIFICANT CHANGE UP (ref 0.2–1.2)
BILIRUB UR-MCNC: NEGATIVE — SIGNIFICANT CHANGE UP
BUN SERPL-MCNC: 11 MG/DL — SIGNIFICANT CHANGE UP (ref 7–23)
CALCIUM SERPL-MCNC: 8.7 MG/DL — SIGNIFICANT CHANGE UP (ref 8.5–10.1)
CHLORIDE SERPL-SCNC: 108 MMOL/L — SIGNIFICANT CHANGE UP (ref 96–108)
CO2 SERPL-SCNC: 28 MMOL/L — SIGNIFICANT CHANGE UP (ref 22–31)
COLOR SPEC: YELLOW — SIGNIFICANT CHANGE UP
CREAT SERPL-MCNC: 0.64 MG/DL — SIGNIFICANT CHANGE UP (ref 0.5–1.3)
DIFF PNL FLD: NEGATIVE — SIGNIFICANT CHANGE UP
EGFR: 124 ML/MIN/1.73M2 — SIGNIFICANT CHANGE UP
EOSINOPHIL # BLD AUTO: 0.2 K/UL — SIGNIFICANT CHANGE UP (ref 0–0.5)
EOSINOPHIL NFR BLD AUTO: 3.5 % — SIGNIFICANT CHANGE UP (ref 0–6)
GLUCOSE SERPL-MCNC: 80 MG/DL — SIGNIFICANT CHANGE UP (ref 70–99)
GLUCOSE UR QL: NEGATIVE — SIGNIFICANT CHANGE UP
HCG SERPL-ACNC: <1 MIU/ML — SIGNIFICANT CHANGE UP
HCT VFR BLD CALC: 39 % — SIGNIFICANT CHANGE UP (ref 34.5–45)
HGB BLD-MCNC: 12.6 G/DL — SIGNIFICANT CHANGE UP (ref 11.5–15.5)
IMM GRANULOCYTES NFR BLD AUTO: 0.2 % — SIGNIFICANT CHANGE UP (ref 0–0.9)
KETONES UR-MCNC: NEGATIVE — SIGNIFICANT CHANGE UP
LEUKOCYTE ESTERASE UR-ACNC: ABNORMAL
LYMPHOCYTES # BLD AUTO: 2.48 K/UL — SIGNIFICANT CHANGE UP (ref 1–3.3)
LYMPHOCYTES # BLD AUTO: 43.9 % — SIGNIFICANT CHANGE UP (ref 13–44)
MCHC RBC-ENTMCNC: 28.1 PG — SIGNIFICANT CHANGE UP (ref 27–34)
MCHC RBC-ENTMCNC: 32.3 GM/DL — SIGNIFICANT CHANGE UP (ref 32–36)
MCV RBC AUTO: 87.1 FL — SIGNIFICANT CHANGE UP (ref 80–100)
MONOCYTES # BLD AUTO: 0.36 K/UL — SIGNIFICANT CHANGE UP (ref 0–0.9)
MONOCYTES NFR BLD AUTO: 6.4 % — SIGNIFICANT CHANGE UP (ref 2–14)
NEUTROPHILS # BLD AUTO: 2.57 K/UL — SIGNIFICANT CHANGE UP (ref 1.8–7.4)
NEUTROPHILS NFR BLD AUTO: 45.5 % — SIGNIFICANT CHANGE UP (ref 43–77)
NITRITE UR-MCNC: NEGATIVE — SIGNIFICANT CHANGE UP
NRBC # BLD: 0 /100 WBCS — SIGNIFICANT CHANGE UP (ref 0–0)
PH UR: 6 — SIGNIFICANT CHANGE UP (ref 5–8)
PLATELET # BLD AUTO: 265 K/UL — SIGNIFICANT CHANGE UP (ref 150–400)
POTASSIUM SERPL-MCNC: 3.7 MMOL/L — SIGNIFICANT CHANGE UP (ref 3.5–5.3)
POTASSIUM SERPL-SCNC: 3.7 MMOL/L — SIGNIFICANT CHANGE UP (ref 3.5–5.3)
PROT SERPL-MCNC: 7.6 G/DL — SIGNIFICANT CHANGE UP (ref 6–8.3)
PROT UR-MCNC: 15
RBC # BLD: 4.48 M/UL — SIGNIFICANT CHANGE UP (ref 3.8–5.2)
RBC # FLD: 13.2 % — SIGNIFICANT CHANGE UP (ref 10.3–14.5)
SODIUM SERPL-SCNC: 140 MMOL/L — SIGNIFICANT CHANGE UP (ref 135–145)
SP GR SPEC: 1.01 — SIGNIFICANT CHANGE UP (ref 1.01–1.02)
UROBILINOGEN FLD QL: NEGATIVE — SIGNIFICANT CHANGE UP
WBC # BLD: 5.65 K/UL — SIGNIFICANT CHANGE UP (ref 3.8–10.5)
WBC # FLD AUTO: 5.65 K/UL — SIGNIFICANT CHANGE UP (ref 3.8–10.5)

## 2023-01-31 PROCEDURE — 80053 COMPREHEN METABOLIC PANEL: CPT

## 2023-01-31 PROCEDURE — 87086 URINE CULTURE/COLONY COUNT: CPT

## 2023-01-31 PROCEDURE — 84702 CHORIONIC GONADOTROPIN TEST: CPT

## 2023-01-31 PROCEDURE — 81001 URINALYSIS AUTO W/SCOPE: CPT

## 2023-01-31 PROCEDURE — 85025 COMPLETE CBC W/AUTO DIFF WBC: CPT

## 2023-01-31 PROCEDURE — 36415 COLL VENOUS BLD VENIPUNCTURE: CPT

## 2023-01-31 PROCEDURE — 99284 EMERGENCY DEPT VISIT MOD MDM: CPT

## 2023-01-31 PROCEDURE — 74177 CT ABD & PELVIS W/CONTRAST: CPT | Mod: MA

## 2023-01-31 PROCEDURE — 74177 CT ABD & PELVIS W/CONTRAST: CPT | Mod: 26,MA

## 2023-01-31 RX ORDER — SODIUM CHLORIDE 9 MG/ML
1000 INJECTION INTRAMUSCULAR; INTRAVENOUS; SUBCUTANEOUS ONCE
Refills: 0 | Status: COMPLETED | OUTPATIENT
Start: 2023-01-31 | End: 2023-01-31

## 2023-01-31 RX ORDER — DIATRIZOATE MEGLUMINE 180 MG/ML
30 INJECTION, SOLUTION INTRAVESICAL ONCE
Refills: 0 | Status: COMPLETED | OUTPATIENT
Start: 2023-01-31 | End: 2023-01-31

## 2023-01-31 RX ADMIN — SODIUM CHLORIDE 1000 MILLILITER(S): 9 INJECTION INTRAMUSCULAR; INTRAVENOUS; SUBCUTANEOUS at 11:57

## 2023-01-31 RX ADMIN — DIATRIZOATE MEGLUMINE 30 MILLILITER(S): 180 INJECTION, SOLUTION INTRAVESICAL at 11:55

## 2023-01-31 NOTE — ED ADULT NURSE NOTE - OBJECTIVE STATEMENT
Pt a/ox4, reports abdominal paint that radiates to right flank and right leg.  No other signs of acute distress noted.

## 2023-01-31 NOTE — ED PROVIDER NOTE - CLINICAL SUMMARY MEDICAL DECISION MAKING FREE TEXT BOX
pt with RLQ abd pain pt with RLQ abd pain, CT abd/pel unremarkable, pt  stable for dc.  Pt's UA equivocal for UTI but given lower abd pain will start course of abx and wait for culture

## 2023-01-31 NOTE — ED PROVIDER NOTE - NSFOLLOWUPINSTRUCTIONS_ED_ALL_ED_FT
-- You should update your primary care physician on your Emergency Department visit and follow up with them.  If you do not have a physician or have difficulty following up, please call: [phone] to obtain a [facility] doctor or specialist who can provide follow up.    -- Return to the ER for worsening or persistent symptoms, and/or ANY NEW OR CONCERNING SYMPTOMS.      Abdominal Pain, Adult      Many things can cause belly (abdominal) pain. Most times, belly pain is not dangerous. Many cases of belly pain can be watched and treated at home. Sometimes, though, belly pain is serious. Your doctor will try to find the cause of your belly pain.      Follow these instructions at home:     Medicines     •Take over-the-counter and prescription medicines only as told by your doctor.      • Do not take medicines that help you poop (laxatives) unless told by your doctor.      General instructions     •Watch your belly pain for any changes.      •Drink enough fluid to keep your pee (urine) pale yellow.      •Keep all follow-up visits as told by your doctor. This is important.        Contact a doctor if:    •Your belly pain changes or gets worse.      •You are not hungry, or you lose weight without trying.      •You are having trouble pooping (constipated) or have watery poop (diarrhea) for more than 2–3 days.      •You have pain when you pee or poop.      •Your belly pain wakes you up at night.      •Your pain gets worse with meals, after eating, or with certain foods.      •You are vomiting and cannot keep anything down.      •You have a fever.      •You have blood in your pee.        Get help right away if:    •Your pain does not go away as soon as your doctor says it should.      •You cannot stop vomiting.      •Your pain is only in areas of your belly, such as the right side or the left lower part of the belly.      •You have bloody or black poop, or poop that looks like tar.      •You have very bad pain, cramping, or bloating in your belly.    •You have signs of not having enough fluid or water in your body (dehydration), such as:  •Dark pee, very little pee, or no pee.      •Cracked lips.      •Dry mouth.      •Sunken eyes.      •Sleepiness.      •Weakness.        •You have trouble breathing or chest pain.        Summary    •Many cases of belly pain can be watched and treated at home.      •Watch your belly pain for any changes.      •Take over-the-counter and prescription medicines only as told by your doctor.      •Contact a doctor if your belly pain changes or gets worse.      •Get help right away if you have very bad pain, cramping, or bloating in your belly.      This information is not intended to replace advice given to you by your health care provider. Make sure you discuss any questions you have with your health care provider.

## 2023-01-31 NOTE — ED PROVIDER NOTE - PATIENT PORTAL LINK FT
You can access the FollowMyHealth Patient Portal offered by [facility] by registering at the following website: [email or web address]. By joining [employer]’s FollowMyHealth portal, you will also be able to view your health information using other applications (apps) compatible with our system.

## 2023-01-31 NOTE — ED ADULT TRIAGE NOTE - CHIEF COMPLAINT QUOTE
Patient is a 28yo female sent by her OBGYN for rule out of appendicitis Patient complaining of right lower quadrant pain since yesterday Patient denies fever nausea vomiting diarrhea

## 2023-01-31 NOTE — ED PROVIDER NOTE - PHYSICAL EXAMINATION
Gen: alert, NAD  HEENT:  NC/AT, PERR, no exophthalmos  CV:  well perfused, rrr   Pulm:  normal RR, breathing comfortably, CTA b/l  Abd: RLQ abd pain  MSK: moving all extremities  Neuro:  non-focal  Skin:  visualized areas intact  Psych: AOx3

## 2023-01-31 NOTE — ED PROVIDER NOTE - OBJECTIVE STATEMENT
Patient states that she has been having right lower quadrant abdominal pain since last night.  Patient denies any nausea vomiting or diarrhea and denies any change in appetite.  Patient also denies any fever.  Patient has had no abdominal surgeries.  Patient denies any dysuria or frequency.  Patient has no history of ovarian cysts or GYN pathology.  Patient has never been pregnant.

## 2023-02-01 LAB
CULTURE RESULTS: SIGNIFICANT CHANGE UP
SPECIMEN SOURCE: SIGNIFICANT CHANGE UP

## 2023-02-15 ENCOUNTER — APPOINTMENT (OUTPATIENT)
Dept: INTERNAL MEDICINE | Facility: CLINIC | Age: 28
End: 2023-02-15
Payer: COMMERCIAL

## 2023-02-15 VITALS
WEIGHT: 130 LBS | RESPIRATION RATE: 17 BRPM | HEIGHT: 63 IN | BODY MASS INDEX: 23.04 KG/M2 | DIASTOLIC BLOOD PRESSURE: 66 MMHG | SYSTOLIC BLOOD PRESSURE: 106 MMHG | TEMPERATURE: 98 F | HEART RATE: 67 BPM | OXYGEN SATURATION: 99 %

## 2023-02-15 DIAGNOSIS — M54.12 RADICULOPATHY, CERVICAL REGION: ICD-10-CM

## 2023-02-15 DIAGNOSIS — M54.2 CERVICALGIA: ICD-10-CM

## 2023-02-15 PROCEDURE — 99213 OFFICE O/P EST LOW 20 MIN: CPT | Mod: 25

## 2023-02-15 RX ORDER — MELOXICAM 7.5 MG/1
7.5 TABLET ORAL
Qty: 30 | Refills: 0 | Status: ACTIVE | COMMUNITY
Start: 2023-02-15 | End: 1900-01-01

## 2023-02-15 NOTE — PHYSICAL EXAM
[No Acute Distress] : no acute distress [Well Nourished] : well nourished [Well Developed] : well developed [Well-Appearing] : well-appearing [Normal Voice/Communication] : normal voice/communication [Normal Sclera/Conjunctiva] : normal sclera/conjunctiva [PERRL] : pupils equal round and reactive to light [EOMI] : extraocular movements intact [Normal Outer Ear/Nose] : the outer ears and nose were normal in appearance [Normal Oropharynx] : the oropharynx was normal [Normal TMs] : both tympanic membranes were normal [Normal Nasal Mucosa] : the nasal mucosa was normal [No JVD] : no jugular venous distention [No Lymphadenopathy] : no lymphadenopathy [Supple] : supple [Thyroid Normal, No Nodules] : the thyroid was normal and there were no nodules present [No Respiratory Distress] : no respiratory distress  [No Accessory Muscle Use] : no accessory muscle use [Clear to Auscultation] : lungs were clear to auscultation bilaterally [Normal Rate] : normal rate  [Regular Rhythm] : with a regular rhythm [Normal S1, S2] : normal S1 and S2 [No Murmur] : no murmur heard [No Carotid Bruits] : no carotid bruits [No Edema] : there was no peripheral edema [No Palpable Aorta] : no palpable aorta [Soft] : abdomen soft [Non Tender] : non-tender [Non-distended] : non-distended [No Masses] : no abdominal mass palpated [No HSM] : no HSM [Normal Bowel Sounds] : normal bowel sounds [No Hernias] : no hernias [No CVA Tenderness] : no CVA  tenderness [No Spinal Tenderness] : no spinal tenderness [No Joint Swelling] : no joint swelling [Grossly Normal Strength/Tone] : grossly normal strength/tone [No Rash] : no rash [Coordination Grossly Intact] : coordination grossly intact [No Focal Deficits] : no focal deficits [Normal Gait] : normal gait [Deep Tendon Reflexes (DTR)] : deep tendon reflexes were 2+ and symmetric [Speech Grossly Normal] : speech grossly normal [Memory Grossly Normal] : memory grossly normal [Normal Affect] : the affect was normal [Alert and Oriented x3] : oriented to person, place, and time [Normal Mood] : the mood was normal [Normal Insight/Judgement] : insight and judgment were intact [Normal Supraclavicular Nodes] : no supraclavicular lymphadenopathy [Normal Posterior Cervical Nodes] : no posterior cervical lymphadenopathy [Normal Anterior Cervical Nodes] : no anterior cervical lymphadenopathy [Kyphosis] : no kyphosis [Scoliosis] : no scoliosis [Acne] : no acne [de-identified] : left cervical paraspinal muscle spasm

## 2023-02-15 NOTE — ASSESSMENT
[FreeTextEntry1] : Patient is a 26 year old female with a past medical history as above who presents for 1 month history of left neck pain with left cervical radiculopathy.

## 2023-02-15 NOTE — PLAN
[FreeTextEntry1] : Orthopedics\par left neck pain with left cervical radiculopathy-check MRI cervical spine, RX meloxicam 7.5 mg 1-2 tabs po qd with food prn

## 2023-02-15 NOTE — REVIEW OF SYSTEMS
[Fatigue] : fatigue [Palpitations] : palpitations [Insomnia] : insomnia [Anxiety] : anxiety [Negative] : Heme/Lymph [FreeTextEntry5] : chest pressure  [FreeTextEntry9] : LUE pain [de-identified] : increased stress

## 2023-02-15 NOTE — HISTORY OF PRESENT ILLNESS
[FreeTextEntry8] : Patient is a 27 year old female with a past medical history as below who presents for evaluation of left neck pain.  She states that the pain begain approximately 1 month ago and radiates down her left shoulder and upper extremity.  She does not recall an inciting event that precipitated the symptoms.  She denies trauma to the head and neck.  She has not tried taking any OTC anti-inflammatory medications or analgesics.

## 2023-03-03 ENCOUNTER — APPOINTMENT (OUTPATIENT)
Dept: OBGYN | Facility: CLINIC | Age: 28
End: 2023-03-03
Payer: COMMERCIAL

## 2023-03-03 VITALS
BODY MASS INDEX: 23.57 KG/M2 | SYSTOLIC BLOOD PRESSURE: 110 MMHG | WEIGHT: 133 LBS | HEIGHT: 63 IN | DIASTOLIC BLOOD PRESSURE: 75 MMHG

## 2023-03-03 DIAGNOSIS — N89.8 OTHER SPECIFIED NONINFLAMMATORY DISORDERS OF VAGINA: ICD-10-CM

## 2023-03-03 DIAGNOSIS — N64.52 NIPPLE DISCHARGE: ICD-10-CM

## 2023-03-03 DIAGNOSIS — N93.9 ABNORMAL UTERINE AND VAGINAL BLEEDING, UNSPECIFIED: ICD-10-CM

## 2023-03-03 PROCEDURE — 99204 OFFICE O/P NEW MOD 45 MIN: CPT

## 2023-03-24 LAB
PROLACTIN SERPL-MCNC: 21.5 NG/ML
TSH SERPL-ACNC: 2.24 UIU/ML

## 2023-03-27 ENCOUNTER — NON-APPOINTMENT (OUTPATIENT)
Age: 28
End: 2023-03-27

## 2023-04-08 ENCOUNTER — APPOINTMENT (OUTPATIENT)
Dept: ULTRASOUND IMAGING | Facility: CLINIC | Age: 28
End: 2023-04-08
Payer: COMMERCIAL

## 2023-04-08 ENCOUNTER — APPOINTMENT (OUTPATIENT)
Dept: MRI IMAGING | Facility: CLINIC | Age: 28
End: 2023-04-08
Payer: COMMERCIAL

## 2023-04-08 ENCOUNTER — RESULT REVIEW (OUTPATIENT)
Age: 28
End: 2023-04-08

## 2023-04-08 ENCOUNTER — OUTPATIENT (OUTPATIENT)
Dept: OUTPATIENT SERVICES | Facility: HOSPITAL | Age: 28
LOS: 1 days | End: 2023-04-08
Payer: COMMERCIAL

## 2023-04-08 DIAGNOSIS — N93.9 ABNORMAL UTERINE AND VAGINAL BLEEDING, UNSPECIFIED: ICD-10-CM

## 2023-04-08 DIAGNOSIS — M54.12 RADICULOPATHY, CERVICAL REGION: ICD-10-CM

## 2023-04-08 DIAGNOSIS — Z00.8 ENCOUNTER FOR OTHER GENERAL EXAMINATION: ICD-10-CM

## 2023-04-08 PROCEDURE — 72141 MRI NECK SPINE W/O DYE: CPT | Mod: 26

## 2023-04-08 PROCEDURE — 76830 TRANSVAGINAL US NON-OB: CPT | Mod: 26

## 2023-04-08 PROCEDURE — 72141 MRI NECK SPINE W/O DYE: CPT

## 2023-04-08 PROCEDURE — 76856 US EXAM PELVIC COMPLETE: CPT

## 2023-04-08 PROCEDURE — 76830 TRANSVAGINAL US NON-OB: CPT

## 2023-04-08 PROCEDURE — 76856 US EXAM PELVIC COMPLETE: CPT | Mod: 26

## 2023-04-17 ENCOUNTER — NON-APPOINTMENT (OUTPATIENT)
Age: 28
End: 2023-04-17

## 2023-04-20 ENCOUNTER — TRANSCRIPTION ENCOUNTER (OUTPATIENT)
Age: 28
End: 2023-04-20

## 2023-07-03 DIAGNOSIS — J03.90 ACUTE TONSILLITIS, UNSPECIFIED: ICD-10-CM

## 2023-07-03 RX ORDER — AZELASTINE HYDROCHLORIDE 137 UG/1
137 SPRAY, METERED NASAL TWICE DAILY
Qty: 1 | Refills: 5 | Status: ACTIVE | COMMUNITY
Start: 2023-07-03 | End: 1900-01-01

## 2023-11-24 ENCOUNTER — NON-APPOINTMENT (OUTPATIENT)
Age: 28
End: 2023-11-24

## 2023-11-30 DIAGNOSIS — R39.9 UNSPECIFIED SYMPTOMS AND SIGNS INVOLVING THE GENITOURINARY SYSTEM: ICD-10-CM

## 2023-12-05 LAB — BACTERIA UR CULT: NORMAL

## 2023-12-06 ENCOUNTER — APPOINTMENT (OUTPATIENT)
Dept: INTERNAL MEDICINE | Facility: CLINIC | Age: 28
End: 2023-12-06

## 2023-12-20 ENCOUNTER — APPOINTMENT (OUTPATIENT)
Dept: INTERNAL MEDICINE | Facility: CLINIC | Age: 28
End: 2023-12-20
Payer: COMMERCIAL

## 2023-12-20 VITALS
SYSTOLIC BLOOD PRESSURE: 112 MMHG | TEMPERATURE: 98.4 F | WEIGHT: 135 LBS | RESPIRATION RATE: 16 BRPM | BODY MASS INDEX: 23.92 KG/M2 | OXYGEN SATURATION: 98 % | HEART RATE: 58 BPM | DIASTOLIC BLOOD PRESSURE: 72 MMHG | HEIGHT: 63 IN

## 2023-12-20 DIAGNOSIS — D21.9 BENIGN NEOPLASM OF CONNECTIVE AND OTHER SOFT TISSUE, UNSPECIFIED: ICD-10-CM

## 2023-12-20 DIAGNOSIS — Z00.00 ENCOUNTER FOR GENERAL ADULT MEDICAL EXAMINATION W/OUT ABNORMAL FINDINGS: ICD-10-CM

## 2023-12-20 PROCEDURE — 99395 PREV VISIT EST AGE 18-39: CPT

## 2023-12-20 RX ORDER — AMOXICILLIN AND CLAVULANATE POTASSIUM 875; 125 MG/1; MG/1
875-125 TABLET, COATED ORAL
Qty: 20 | Refills: 0 | Status: DISCONTINUED | COMMUNITY
Start: 2022-11-28 | End: 2023-12-20

## 2023-12-20 RX ORDER — AZITHROMYCIN 500 MG/1
500 TABLET, FILM COATED ORAL DAILY
Qty: 7 | Refills: 0 | Status: DISCONTINUED | COMMUNITY
Start: 2023-07-03 | End: 2023-12-20

## 2023-12-20 NOTE — END OF VISIT
[FreeTextEntry3] : "I, Markel Field, personally scribed the services dictated to me by Dr. Riky Riddle MD in this documentation on 12/20/2023"   "I Dr. Riky Riddle MD, personally performed the services described in this documentation on 12/20/2023 for the patient as scribed by Markel Field in my presence. I have reviewed and verified that all the information is accurate and true."

## 2023-12-20 NOTE — HISTORY OF PRESENT ILLNESS
[Spouse] : spouse [FreeTextEntry1] : annual physical  [de-identified] :  PARADISE MCCOLLUMFELIXNOEL is a 28 year old F who presents today for her annual physical. Pt complains of nasal congestion and itchiness in her eyes.

## 2023-12-20 NOTE — HEALTH RISK ASSESSMENT
[Good] : ~his/her~  mood as  good [Never (0 pts)] : Never (0 points) [No] : In the past 12 months have you used drugs other than those required for medical reasons? No [No falls in past year] : Patient reported no falls in the past year [Little interest or pleasure doing things] : 1) Little interest or pleasure doing things [Feeling down, depressed, or hopeless] : 2) Feeling down, depressed, or hopeless [0] : 2) Feeling down, depressed, or hopeless: Not at all (0) [PHQ-2 Negative - No further assessment needed] : PHQ-2 Negative - No further assessment needed [Never] : Never [[id number]] : 0

## 2023-12-26 LAB
ALBUMIN SERPL ELPH-MCNC: 4.6 G/DL
ALP BLD-CCNC: 90 U/L
ALT SERPL-CCNC: 20 U/L
ANION GAP SERPL CALC-SCNC: 14 MMOL/L
APPEARANCE: CLEAR
AST SERPL-CCNC: 16 U/L
BACTERIA: NEGATIVE /HPF
BASOPHILS # BLD AUTO: 0.05 K/UL
BASOPHILS NFR BLD AUTO: 0.7 %
BILIRUB SERPL-MCNC: 0.4 MG/DL
BILIRUBIN URINE: NEGATIVE
BLOOD URINE: NEGATIVE
BUN SERPL-MCNC: 17 MG/DL
CALCIUM SERPL-MCNC: 9.5 MG/DL
CAST: 0 /LPF
CHLORIDE SERPL-SCNC: 102 MMOL/L
CHOLEST SERPL-MCNC: 165 MG/DL
CK SERPL-CCNC: 45 U/L
CO2 SERPL-SCNC: 23 MMOL/L
COLOR: YELLOW
CREAT SERPL-MCNC: 0.69 MG/DL
EGFR: 121 ML/MIN/1.73M2
EOSINOPHIL # BLD AUTO: 0.39 K/UL
EOSINOPHIL NFR BLD AUTO: 5.1 %
EPITHELIAL CELLS: 1 /HPF
ESTIMATED AVERAGE GLUCOSE: 103 MG/DL
GLUCOSE QUALITATIVE U: NEGATIVE MG/DL
GLUCOSE SERPL-MCNC: 81 MG/DL
HBA1C MFR BLD HPLC: 5.2 %
HCG SERPL-MCNC: <1 MIU/ML
HCT VFR BLD CALC: 38.6 %
HDLC SERPL-MCNC: 54 MG/DL
HGB BLD-MCNC: 12.6 G/DL
IMM GRANULOCYTES NFR BLD AUTO: 0.3 %
KETONES URINE: NEGATIVE MG/DL
LDLC SERPL CALC-MCNC: 98 MG/DL
LEUKOCYTE ESTERASE URINE: ABNORMAL
LYMPHOCYTES # BLD AUTO: 2.73 K/UL
LYMPHOCYTES NFR BLD AUTO: 35.5 %
MAN DIFF?: NORMAL
MCHC RBC-ENTMCNC: 28.8 PG
MCHC RBC-ENTMCNC: 32.6 GM/DL
MCV RBC AUTO: 88.1 FL
MICROSCOPIC-UA: NORMAL
MONOCYTES # BLD AUTO: 0.9 K/UL
MONOCYTES NFR BLD AUTO: 11.7 %
NEUTROPHILS # BLD AUTO: 3.6 K/UL
NEUTROPHILS NFR BLD AUTO: 46.7 %
NITRITE URINE: NEGATIVE
NONHDLC SERPL-MCNC: 110 MG/DL
PH URINE: 6.5
PLATELET # BLD AUTO: 266 K/UL
POTASSIUM SERPL-SCNC: 4.2 MMOL/L
PROT SERPL-MCNC: 7.6 G/DL
PROTEIN URINE: NEGATIVE MG/DL
RBC # BLD: 4.38 M/UL
RBC # FLD: 13.4 %
RED BLOOD CELLS URINE: 1 /HPF
SODIUM SERPL-SCNC: 138 MMOL/L
SPECIFIC GRAVITY URINE: 1.01
TRIGL SERPL-MCNC: 63 MG/DL
TSH SERPL-ACNC: 2.3 UIU/ML
UROBILINOGEN URINE: 0.2 MG/DL
WBC # FLD AUTO: 7.69 K/UL
WHITE BLOOD CELLS URINE: 0 /HPF

## 2023-12-27 RX ORDER — AMOXICILLIN AND CLAVULANATE POTASSIUM 875; 125 MG/1; MG/1
875-125 TABLET, COATED ORAL
Qty: 20 | Refills: 0 | Status: ACTIVE | COMMUNITY
Start: 2023-12-27 | End: 1900-01-01

## 2024-01-06 ENCOUNTER — APPOINTMENT (OUTPATIENT)
Dept: ULTRASOUND IMAGING | Facility: CLINIC | Age: 29
End: 2024-01-06
Payer: COMMERCIAL

## 2024-01-06 ENCOUNTER — OUTPATIENT (OUTPATIENT)
Dept: OUTPATIENT SERVICES | Facility: HOSPITAL | Age: 29
LOS: 1 days | End: 2024-01-06
Payer: COMMERCIAL

## 2024-01-06 ENCOUNTER — RESULT REVIEW (OUTPATIENT)
Age: 29
End: 2024-01-06

## 2024-01-06 DIAGNOSIS — D21.9 BENIGN NEOPLASM OF CONNECTIVE AND OTHER SOFT TISSUE, UNSPECIFIED: ICD-10-CM

## 2024-01-06 PROCEDURE — 76856 US EXAM PELVIC COMPLETE: CPT | Mod: 26

## 2024-01-06 PROCEDURE — 76856 US EXAM PELVIC COMPLETE: CPT

## 2024-01-06 PROCEDURE — 76830 TRANSVAGINAL US NON-OB: CPT

## 2024-01-06 PROCEDURE — 76830 TRANSVAGINAL US NON-OB: CPT | Mod: 26

## 2024-01-19 ENCOUNTER — APPOINTMENT (OUTPATIENT)
Dept: OBGYN | Facility: CLINIC | Age: 29
End: 2024-01-19
Payer: COMMERCIAL

## 2024-01-19 VITALS
DIASTOLIC BLOOD PRESSURE: 56 MMHG | WEIGHT: 135 LBS | HEIGHT: 63 IN | SYSTOLIC BLOOD PRESSURE: 100 MMHG | BODY MASS INDEX: 23.92 KG/M2

## 2024-01-19 DIAGNOSIS — Z30.011 ENCOUNTER FOR INITIAL PRESCRIPTION OF CONTRACEPTIVE PILLS: ICD-10-CM

## 2024-01-19 PROCEDURE — 99214 OFFICE O/P EST MOD 30 MIN: CPT

## 2024-01-19 RX ORDER — NORETHINDRONE ACETATE/ETHINYL ESTRADIOL AND FERROUS FUMARATE 1MG-20(21)
1-20 KIT ORAL DAILY
Qty: 90 | Refills: 1 | Status: ACTIVE | COMMUNITY
Start: 2024-01-19 | End: 1900-01-01

## 2024-02-19 NOTE — REVIEW OF SYSTEMS
Problem: Safety - Adult  Goal: Free from fall injury  Outcome: Adequate for Discharge  Flowsheets (Taken 2/19/2024 1620)  Free From Fall Injury: Instruct family/caregiver on patient safety     Problem: Discharge Planning  Goal: Discharge to home or other facility with appropriate resources  Outcome: Adequate for Discharge  Flowsheets (Taken 2/19/2024 1620)  Discharge to home or other facility with appropriate resources: Identify barriers to discharge with patient and caregiver     Problem: Chronic Conditions and Co-morbidities  Goal: Patient's chronic conditions and co-morbidity symptoms are monitored and maintained or improved  Outcome: Adequate for Discharge  Flowsheets (Taken 2/19/2024 1620)  Care Plan - Patient's Chronic Conditions and Co-Morbidity Symptoms are Monitored and Maintained or Improved: Monitor and assess patient's chronic conditions and comorbid symptoms for stability, deterioration, or improvement     Care plan reviewed with patient.  Patient verbalize understanding of the plan of care and contribute to goal setting.       [Negative] : Heme/Lymph

## 2024-04-28 ENCOUNTER — NON-APPOINTMENT (OUTPATIENT)
Age: 29
End: 2024-04-28

## 2024-04-29 ENCOUNTER — APPOINTMENT (OUTPATIENT)
Dept: ORTHOPEDIC SURGERY | Facility: CLINIC | Age: 29
End: 2024-04-29

## 2024-04-29 DIAGNOSIS — M25.561 PAIN IN RIGHT KNEE: ICD-10-CM

## 2024-04-29 DIAGNOSIS — M25.562 PAIN IN RIGHT KNEE: ICD-10-CM

## 2024-04-29 DIAGNOSIS — M25.512 PAIN IN LEFT SHOULDER: ICD-10-CM

## 2024-04-29 NOTE — HISTORY OF PRESENT ILLNESS
[de-identified] : Patient is a 28 year-old, -hand-dominant female who presents to the office today for initial evaluation of bilateral knee and left shoulder pain.

## 2024-08-09 ENCOUNTER — APPOINTMENT (OUTPATIENT)
Dept: OBGYN | Facility: CLINIC | Age: 29
End: 2024-08-09

## 2024-08-09 PROBLEM — Z01.419 ENCOUNTER FOR ANNUAL ROUTINE GYNECOLOGICAL EXAMINATION: Status: ACTIVE | Noted: 2024-08-09

## 2024-08-09 PROCEDURE — 99395 PREV VISIT EST AGE 18-39: CPT

## 2024-09-07 ENCOUNTER — OUTPATIENT (OUTPATIENT)
Dept: OUTPATIENT SERVICES | Facility: HOSPITAL | Age: 29
LOS: 1 days | End: 2024-09-07
Payer: COMMERCIAL

## 2024-09-07 ENCOUNTER — APPOINTMENT (OUTPATIENT)
Dept: ULTRASOUND IMAGING | Facility: CLINIC | Age: 29
End: 2024-09-07
Payer: COMMERCIAL

## 2024-09-07 ENCOUNTER — RESULT REVIEW (OUTPATIENT)
Age: 29
End: 2024-09-07

## 2024-09-07 DIAGNOSIS — D21.9 BENIGN NEOPLASM OF CONNECTIVE AND OTHER SOFT TISSUE, UNSPECIFIED: ICD-10-CM

## 2024-09-07 PROCEDURE — 76856 US EXAM PELVIC COMPLETE: CPT | Mod: 26

## 2024-09-07 PROCEDURE — 76830 TRANSVAGINAL US NON-OB: CPT | Mod: 26

## 2024-09-07 PROCEDURE — 76856 US EXAM PELVIC COMPLETE: CPT

## 2024-09-07 PROCEDURE — 76830 TRANSVAGINAL US NON-OB: CPT

## 2024-09-09 ENCOUNTER — APPOINTMENT (OUTPATIENT)
Dept: UROLOGY | Facility: CLINIC | Age: 29
End: 2024-09-09

## 2024-10-14 ENCOUNTER — APPOINTMENT (OUTPATIENT)
Dept: INTERNAL MEDICINE | Facility: CLINIC | Age: 29
End: 2024-10-14
Payer: COMMERCIAL

## 2024-10-14 VITALS
HEIGHT: 63 IN | DIASTOLIC BLOOD PRESSURE: 67 MMHG | HEART RATE: 56 BPM | SYSTOLIC BLOOD PRESSURE: 110 MMHG | BODY MASS INDEX: 24.1 KG/M2 | OXYGEN SATURATION: 99 % | WEIGHT: 136 LBS

## 2024-10-14 DIAGNOSIS — N64.52 NIPPLE DISCHARGE: ICD-10-CM

## 2024-10-14 DIAGNOSIS — R09.82 POSTNASAL DRIP: ICD-10-CM

## 2024-10-14 DIAGNOSIS — R07.89 OTHER CHEST PAIN: ICD-10-CM

## 2024-10-14 PROCEDURE — 99214 OFFICE O/P EST MOD 30 MIN: CPT

## 2024-10-14 RX ORDER — ALBUTEROL SULFATE 90 UG/1
108 (90 BASE) INHALANT RESPIRATORY (INHALATION)
Qty: 1 | Refills: 2 | Status: ACTIVE | COMMUNITY
Start: 2024-10-14 | End: 1900-01-01

## 2024-10-14 RX ORDER — FLUTICASONE PROPIONATE 50 UG/1
50 SPRAY, METERED NASAL
Qty: 1 | Refills: 0 | Status: ACTIVE | COMMUNITY
Start: 2024-10-14 | End: 1900-01-01

## 2024-10-15 ENCOUNTER — APPOINTMENT (OUTPATIENT)
Dept: INTERNAL MEDICINE | Facility: CLINIC | Age: 29
End: 2024-10-15

## 2024-10-24 LAB
ALBUMIN SERPL ELPH-MCNC: 4.4 G/DL
ALP BLD-CCNC: 81 U/L
ALT SERPL-CCNC: 13 U/L
ANION GAP SERPL CALC-SCNC: 17 MMOL/L
AST SERPL-CCNC: 20 U/L
BASOPHILS # BLD AUTO: 0.05 K/UL
BASOPHILS NFR BLD AUTO: 0.7 %
BILIRUB SERPL-MCNC: 0.4 MG/DL
BUN SERPL-MCNC: 11 MG/DL
CALCIUM SERPL-MCNC: 9 MG/DL
CHLORIDE SERPL-SCNC: 102 MMOL/L
CO2 SERPL-SCNC: 17 MMOL/L
CREAT SERPL-MCNC: 0.6 MG/DL
EGFR: 125 ML/MIN/1.73M2
EOSINOPHIL # BLD AUTO: 0.32 K/UL
EOSINOPHIL NFR BLD AUTO: 4.6 %
GLUCOSE SERPL-MCNC: 68 MG/DL
HCT VFR BLD CALC: 35.6 %
HGB BLD-MCNC: 11.5 G/DL
IMM GRANULOCYTES NFR BLD AUTO: 0.1 %
LYMPHOCYTES # BLD AUTO: 2.98 K/UL
LYMPHOCYTES NFR BLD AUTO: 42.8 %
MAN DIFF?: NORMAL
MCHC RBC-ENTMCNC: 28 PG
MCHC RBC-ENTMCNC: 32.3 GM/DL
MCV RBC AUTO: 86.8 FL
MONOCYTES # BLD AUTO: 0.5 K/UL
MONOCYTES NFR BLD AUTO: 7.2 %
NEUTROPHILS # BLD AUTO: 3.1 K/UL
NEUTROPHILS NFR BLD AUTO: 44.6 %
PLATELET # BLD AUTO: 276 K/UL
POTASSIUM SERPL-SCNC: 4.5 MMOL/L
PROT SERPL-MCNC: 7.3 G/DL
RBC # BLD: 4.1 M/UL
RBC # FLD: 13.2 %
SODIUM SERPL-SCNC: 135 MMOL/L
WBC # FLD AUTO: 6.96 K/UL

## 2024-11-13 ENCOUNTER — APPOINTMENT (OUTPATIENT)
Dept: INTERNAL MEDICINE | Facility: CLINIC | Age: 29
End: 2024-11-13
Payer: COMMERCIAL

## 2024-11-13 VITALS
DIASTOLIC BLOOD PRESSURE: 72 MMHG | HEIGHT: 63 IN | TEMPERATURE: 98.2 F | RESPIRATION RATE: 16 BRPM | BODY MASS INDEX: 24.27 KG/M2 | SYSTOLIC BLOOD PRESSURE: 110 MMHG | WEIGHT: 137 LBS | OXYGEN SATURATION: 99 % | HEART RATE: 74 BPM

## 2024-11-13 DIAGNOSIS — Z23 ENCOUNTER FOR IMMUNIZATION: ICD-10-CM

## 2024-11-13 DIAGNOSIS — Z00.00 ENCOUNTER FOR GENERAL ADULT MEDICAL EXAMINATION W/OUT ABNORMAL FINDINGS: ICD-10-CM

## 2024-11-13 PROCEDURE — 99395 PREV VISIT EST AGE 18-39: CPT

## 2024-11-15 ENCOUNTER — OUTPATIENT (OUTPATIENT)
Dept: OUTPATIENT SERVICES | Facility: HOSPITAL | Age: 29
LOS: 1 days | End: 2024-11-15
Payer: COMMERCIAL

## 2024-11-15 ENCOUNTER — APPOINTMENT (OUTPATIENT)
Dept: ULTRASOUND IMAGING | Facility: CLINIC | Age: 29
End: 2024-11-15
Payer: COMMERCIAL

## 2024-11-15 ENCOUNTER — RESULT REVIEW (OUTPATIENT)
Age: 29
End: 2024-11-15

## 2024-11-15 DIAGNOSIS — N64.52 NIPPLE DISCHARGE: ICD-10-CM

## 2024-11-15 DIAGNOSIS — Z00.8 ENCOUNTER FOR OTHER GENERAL EXAMINATION: ICD-10-CM

## 2024-11-15 PROCEDURE — 76641 ULTRASOUND BREAST COMPLETE: CPT

## 2024-11-15 PROCEDURE — 76641 ULTRASOUND BREAST COMPLETE: CPT | Mod: 26,50

## 2024-12-05 ENCOUNTER — APPOINTMENT (OUTPATIENT)
Dept: OBGYN | Facility: CLINIC | Age: 29
End: 2024-12-05

## 2024-12-05 VITALS
BODY MASS INDEX: 24.8 KG/M2 | HEIGHT: 63 IN | DIASTOLIC BLOOD PRESSURE: 60 MMHG | SYSTOLIC BLOOD PRESSURE: 102 MMHG | WEIGHT: 140 LBS

## 2024-12-05 PROCEDURE — 81003 URINALYSIS AUTO W/O SCOPE: CPT | Mod: QW

## 2024-12-05 PROCEDURE — 99214 OFFICE O/P EST MOD 30 MIN: CPT

## 2024-12-12 ENCOUNTER — APPOINTMENT (OUTPATIENT)
Dept: OBGYN | Facility: CLINIC | Age: 29
End: 2024-12-12

## 2024-12-13 ENCOUNTER — APPOINTMENT (OUTPATIENT)
Dept: ANTEPARTUM | Facility: CLINIC | Age: 29
End: 2024-12-13
Payer: COMMERCIAL

## 2024-12-13 ENCOUNTER — ASOB RESULT (OUTPATIENT)
Age: 29
End: 2024-12-13

## 2024-12-13 PROCEDURE — 76817 TRANSVAGINAL US OBSTETRIC: CPT

## 2024-12-19 ENCOUNTER — ASOB RESULT (OUTPATIENT)
Age: 29
End: 2024-12-19

## 2024-12-19 ENCOUNTER — APPOINTMENT (OUTPATIENT)
Dept: ANTEPARTUM | Facility: CLINIC | Age: 29
End: 2024-12-19
Payer: COMMERCIAL

## 2024-12-19 PROCEDURE — 76817 TRANSVAGINAL US OBSTETRIC: CPT

## 2024-12-20 DIAGNOSIS — O02.1 MISSED ABORTION: ICD-10-CM

## 2024-12-23 ENCOUNTER — TRANSCRIPTION ENCOUNTER (OUTPATIENT)
Age: 29
End: 2024-12-23

## 2024-12-23 ENCOUNTER — APPOINTMENT (OUTPATIENT)
Dept: OBGYN | Facility: CLINIC | Age: 29
End: 2024-12-23
Payer: COMMERCIAL

## 2024-12-23 VITALS
OXYGEN SATURATION: 99 % | SYSTOLIC BLOOD PRESSURE: 110 MMHG | TEMPERATURE: 98.1 F | HEART RATE: 62 BPM | DIASTOLIC BLOOD PRESSURE: 74 MMHG

## 2024-12-23 DIAGNOSIS — O03.9 COMPLETE OR UNSPECIFIED SPONTANEOUS ABORTION W/OUT COMPLICATION: ICD-10-CM

## 2024-12-23 PROCEDURE — 99212 OFFICE O/P EST SF 10 MIN: CPT | Mod: 25

## 2024-12-23 PROCEDURE — 76817 TRANSVAGINAL US OBSTETRIC: CPT

## 2024-12-23 RX ORDER — IBUPROFEN 600 MG/1
600 TABLET, FILM COATED ORAL 4 TIMES DAILY
Qty: 60 | Refills: 0 | Status: ACTIVE | COMMUNITY
Start: 2024-12-23 | End: 1900-01-01

## 2024-12-27 DIAGNOSIS — Z31.5 ENCOUNTER FOR PROCREATIVE GENETIC COUNSELING: ICD-10-CM

## 2024-12-30 ENCOUNTER — TRANSCRIPTION ENCOUNTER (OUTPATIENT)
Age: 29
End: 2024-12-30

## 2025-01-02 ENCOUNTER — NON-APPOINTMENT (OUTPATIENT)
Age: 30
End: 2025-01-02

## 2025-01-02 ENCOUNTER — APPOINTMENT (OUTPATIENT)
Dept: OPHTHALMOLOGY | Facility: CLINIC | Age: 30
End: 2025-01-02
Payer: COMMERCIAL

## 2025-01-02 ENCOUNTER — APPOINTMENT (OUTPATIENT)
Dept: OBGYN | Facility: CLINIC | Age: 30
End: 2025-01-02

## 2025-01-02 VITALS
HEIGHT: 63 IN | SYSTOLIC BLOOD PRESSURE: 110 MMHG | WEIGHT: 140 LBS | DIASTOLIC BLOOD PRESSURE: 68 MMHG | BODY MASS INDEX: 24.8 KG/M2

## 2025-01-02 LAB
HCG UR QL: NEGATIVE
QUALITY CONTROL: YES

## 2025-01-02 PROCEDURE — 92004 COMPRE OPH EXAM NEW PT 1/>: CPT

## 2025-01-02 PROCEDURE — G2211 COMPLEX E/M VISIT ADD ON: CPT

## 2025-01-02 PROCEDURE — 99214 OFFICE O/P EST MOD 30 MIN: CPT

## 2025-01-02 PROCEDURE — 92133 CPTRZD OPH DX IMG PST SGM ON: CPT

## 2025-01-02 PROCEDURE — 76514 ECHO EXAM OF EYE THICKNESS: CPT

## 2025-01-03 LAB
BASOPHILS # BLD AUTO: 0.05 K/UL
BASOPHILS NFR BLD AUTO: 0.7 %
EOSINOPHIL # BLD AUTO: 0.28 K/UL
EOSINOPHIL NFR BLD AUTO: 3.6 %
HCT VFR BLD CALC: 37.5 %
HGB BLD-MCNC: 11.8 G/DL
IMM GRANULOCYTES NFR BLD AUTO: 0.4 %
LYMPHOCYTES # BLD AUTO: 3.08 K/UL
LYMPHOCYTES NFR BLD AUTO: 40.1 %
MAN DIFF?: NORMAL
MCHC RBC-ENTMCNC: 28.6 PG
MCHC RBC-ENTMCNC: 31.5 G/DL
MCV RBC AUTO: 91 FL
MONOCYTES # BLD AUTO: 0.41 K/UL
MONOCYTES NFR BLD AUTO: 5.3 %
NEUTROPHILS # BLD AUTO: 3.83 K/UL
NEUTROPHILS NFR BLD AUTO: 49.9 %
PLATELET # BLD AUTO: 286 K/UL
RBC # BLD: 4.12 M/UL
RBC # FLD: 13.2 %
WBC # FLD AUTO: 7.68 K/UL

## 2025-01-17 DIAGNOSIS — D21.9 BENIGN NEOPLASM OF CONNECTIVE AND OTHER SOFT TISSUE, UNSPECIFIED: ICD-10-CM

## 2025-01-18 ENCOUNTER — APPOINTMENT (OUTPATIENT)
Dept: ULTRASOUND IMAGING | Facility: CLINIC | Age: 30
End: 2025-01-18
Payer: COMMERCIAL

## 2025-01-18 ENCOUNTER — OUTPATIENT (OUTPATIENT)
Dept: OUTPATIENT SERVICES | Facility: HOSPITAL | Age: 30
LOS: 1 days | End: 2025-01-18
Payer: COMMERCIAL

## 2025-01-18 ENCOUNTER — RESULT REVIEW (OUTPATIENT)
Age: 30
End: 2025-01-18

## 2025-01-18 DIAGNOSIS — D21.9 BENIGN NEOPLASM OF CONNECTIVE AND OTHER SOFT TISSUE, UNSPECIFIED: ICD-10-CM

## 2025-01-18 DIAGNOSIS — Z00.8 ENCOUNTER FOR OTHER GENERAL EXAMINATION: ICD-10-CM

## 2025-01-18 PROCEDURE — 76856 US EXAM PELVIC COMPLETE: CPT | Mod: 26

## 2025-01-18 PROCEDURE — 76830 TRANSVAGINAL US NON-OB: CPT

## 2025-01-18 PROCEDURE — 76830 TRANSVAGINAL US NON-OB: CPT | Mod: 26

## 2025-01-18 PROCEDURE — 76856 US EXAM PELVIC COMPLETE: CPT
